# Patient Record
Sex: MALE | Race: WHITE | NOT HISPANIC OR LATINO | Employment: FULL TIME | URBAN - METROPOLITAN AREA
[De-identification: names, ages, dates, MRNs, and addresses within clinical notes are randomized per-mention and may not be internally consistent; named-entity substitution may affect disease eponyms.]

---

## 2023-05-04 ENCOUNTER — TELEPHONE (OUTPATIENT)
Dept: PRIMARY CARE | Facility: CLINIC | Age: 68
End: 2023-05-04
Payer: MEDICARE

## 2023-05-09 PROBLEM — N41.9 PROSTATITIS: Status: ACTIVE | Noted: 2023-05-09

## 2023-05-09 PROBLEM — S90.851A FOREIGN BODY IN FOOT, RIGHT: Status: ACTIVE | Noted: 2023-05-09

## 2023-05-09 PROBLEM — I49.3 PVCS (PREMATURE VENTRICULAR CONTRACTIONS): Status: ACTIVE | Noted: 2023-05-09

## 2023-05-09 PROBLEM — R42 DIZZINESS: Status: ACTIVE | Noted: 2023-05-09

## 2023-05-09 PROBLEM — R00.2 HEART PALPITATIONS: Status: ACTIVE | Noted: 2023-05-09

## 2023-05-09 PROBLEM — I10 BENIGN ESSENTIAL HTN: Status: ACTIVE | Noted: 2023-05-09

## 2023-05-09 PROBLEM — R07.9 CHEST PAIN: Status: ACTIVE | Noted: 2023-05-09

## 2023-05-09 PROBLEM — R11.0 NAUSEA IN ADULT: Status: ACTIVE | Noted: 2023-05-09

## 2023-05-09 PROBLEM — R09.89 CHOKING EPISODE: Status: ACTIVE | Noted: 2023-05-09

## 2023-05-09 PROBLEM — R53.83 FATIGUE: Status: ACTIVE | Noted: 2023-05-09

## 2023-05-09 PROBLEM — R51.9 HEAD ACHE: Status: ACTIVE | Noted: 2023-05-09

## 2023-05-09 PROBLEM — R05.3 CHRONIC COUGH: Status: ACTIVE | Noted: 2023-05-09

## 2023-05-09 PROBLEM — R93.1 ELEVATED CORONARY ARTERY CALCIUM SCORE: Status: ACTIVE | Noted: 2023-05-09

## 2023-05-09 PROBLEM — E78.00 HYPERCHOLESTEREMIA: Status: ACTIVE | Noted: 2023-05-09

## 2023-05-09 PROBLEM — N50.819 TESTICULAR PAIN: Status: ACTIVE | Noted: 2023-05-09

## 2023-05-09 PROBLEM — R61: Status: ACTIVE | Noted: 2023-05-09

## 2023-05-09 PROBLEM — K21.9 LARYNGOPHARYNGEAL REFLUX: Status: ACTIVE | Noted: 2023-05-09

## 2023-05-09 PROBLEM — U07.1 COVID-19 VIRUS INFECTION: Status: ACTIVE | Noted: 2023-05-09

## 2023-05-09 PROBLEM — M79.671 RIGHT FOOT PAIN: Status: ACTIVE | Noted: 2023-05-09

## 2023-05-10 ENCOUNTER — OFFICE VISIT (OUTPATIENT)
Dept: PRIMARY CARE | Facility: CLINIC | Age: 68
End: 2023-05-10
Payer: MEDICARE

## 2023-05-10 VITALS
BODY MASS INDEX: 26.36 KG/M2 | WEIGHT: 178 LBS | HEART RATE: 57 BPM | HEIGHT: 69 IN | TEMPERATURE: 97.9 F | OXYGEN SATURATION: 97 % | DIASTOLIC BLOOD PRESSURE: 79 MMHG | SYSTOLIC BLOOD PRESSURE: 148 MMHG

## 2023-05-10 DIAGNOSIS — L30.9 DERMATITIS: ICD-10-CM

## 2023-05-10 DIAGNOSIS — L60.9 NAIL DISEASE: ICD-10-CM

## 2023-05-10 DIAGNOSIS — Z00.00 ROUTINE GENERAL MEDICAL EXAMINATION AT HEALTH CARE FACILITY: Primary | ICD-10-CM

## 2023-05-10 DIAGNOSIS — Z12.83 SKIN CANCER SCREENING: ICD-10-CM

## 2023-05-10 PROCEDURE — 1159F MED LIST DOCD IN RCRD: CPT | Performed by: INTERNAL MEDICINE

## 2023-05-10 PROCEDURE — 3077F SYST BP >= 140 MM HG: CPT | Performed by: INTERNAL MEDICINE

## 2023-05-10 PROCEDURE — 1170F FXNL STATUS ASSESSED: CPT | Performed by: INTERNAL MEDICINE

## 2023-05-10 PROCEDURE — G0439 PPPS, SUBSEQ VISIT: HCPCS | Performed by: INTERNAL MEDICINE

## 2023-05-10 PROCEDURE — 1036F TOBACCO NON-USER: CPT | Performed by: INTERNAL MEDICINE

## 2023-05-10 PROCEDURE — 3078F DIAST BP <80 MM HG: CPT | Performed by: INTERNAL MEDICINE

## 2023-05-10 RX ORDER — ROSUVASTATIN CALCIUM 40 MG/1
1 TABLET, COATED ORAL DAILY
COMMUNITY
Start: 2021-05-25 | End: 2023-11-13 | Stop reason: SDUPTHER

## 2023-05-10 RX ORDER — TRIAMCINOLONE ACETONIDE 1 MG/G
CREAM TOPICAL 2 TIMES DAILY
Qty: 45 G | Refills: 1 | Status: SHIPPED | OUTPATIENT
Start: 2023-05-10

## 2023-05-10 RX ORDER — METOPROLOL SUCCINATE 25 MG/1
1 TABLET, EXTENDED RELEASE ORAL DAILY
COMMUNITY
Start: 2021-12-16 | End: 2023-11-13 | Stop reason: SDUPTHER

## 2023-05-10 RX ORDER — ASPIRIN 81 MG/1
TABLET ORAL
COMMUNITY
Start: 2021-05-25

## 2023-05-10 RX ORDER — LOSARTAN POTASSIUM 50 MG/1
1 TABLET ORAL DAILY
COMMUNITY
Start: 2021-04-15 | End: 2023-11-13 | Stop reason: SDUPTHER

## 2023-05-10 ASSESSMENT — PATIENT HEALTH QUESTIONNAIRE - PHQ9
SUM OF ALL RESPONSES TO PHQ9 QUESTIONS 1 AND 2: 0
2. FEELING DOWN, DEPRESSED OR HOPELESS: NOT AT ALL
2. FEELING DOWN, DEPRESSED OR HOPELESS: NOT AT ALL
1. LITTLE INTEREST OR PLEASURE IN DOING THINGS: NOT AT ALL

## 2023-05-10 ASSESSMENT — ACTIVITIES OF DAILY LIVING (ADL)
TAKING_MEDICATION: INDEPENDENT
DRESSING: INDEPENDENT
GROCERY_SHOPPING: INDEPENDENT
MANAGING_FINANCES: INDEPENDENT
BATHING: INDEPENDENT
DOING_HOUSEWORK: INDEPENDENT

## 2023-05-10 NOTE — PROGRESS NOTES
"Medicare Wellness Billing Compliance Satisfied    Review all medications by prescribing practitioner or clinical pharmacist (such as prescriptions, OTCs, herbal therapies and supplements) documented in the medical record    Past Medical, Surgical, and Family History reviewed and updated in chart    Tobacco Use Reviewed    Alcohol Use Reviewed    Illicit Drug Use Reviewed    PHQ2/9    Falls in Last Year Reviewed    Home Safety Risk Factors Reviewed    Cognitive Impairment Reviewed    Patient Self Assessment and Health Status    Current Diet Reviewed    Exercise Frequency    ADL - Hearing Impairment    ADL - Bathing    ADL - Dressing    ADL - Walks in Home    IADL - Managing Finances    IADL - Grocery Shopping    IADL - Taking Medications    IADL - Doing Housework      Subjective   Reason for Visit: Douglas Domingo is an 67 y.o. male here for a Medicare Wellness visit.     Past Medical, Surgical, and Family History reviewed and updated in chart.    Reviewed all medications by prescribing practitioner or clinical pharmacist (such as prescriptions, OTCs, herbal therapies and supplements) and documented in the medical record.    HPI  Here for Medicare wellness and a few concerns:  Sharp left sided chest pain, lasting for a few seconds or so. Rarely. Has been worked up fully and ruled out cardiac.  left palm skin red and peeling. left hand nails are becoming rough and ends splitting      Patient Care Team:  Diana Saez MD as PCP - General  Diana Saez MD as PCP - [employer] PCP     Review of Systems  All other ROS negative    Objective   Vitals:  /79   Pulse 57   Temp 36.6 °C (97.9 °F)   Ht 1.753 m (5' 9\")   Wt 80.7 kg (178 lb)   SpO2 97%   BMI 26.29 kg/m²       Physical Exam  Patient appears well, alert and oriented x 3, cooperative. No depression or anxiety.   Vitals are as documented.  Neck is supple and free of adenopathy, or masses. No thyromegaly.   PERRL, extra eye " muscles are intact.   Heart sounds are normal, no murmur, gallops or rubs.   Lungs are clear to auscultation    Abdomen is soft, no tenderness, masses or organomegaly.  Extremities are normal. Peripheral pulses are normal.   Neurologic exam is normal without focal findings.   Skin is normal without suspicious lesions noted except for left hand palm dermatitis present and left hand nails are rough and ends are splitting  No acute joint swelling or pain.    1. Routine general medical examination at health care facility       2. Nail disease     - Referral to Dermatology; Future    3. Dermatitis     - Referral to Dermatology; Future  - triamcinolone (Kenalog) 0.1 % cream; Apply topically 2 times a day.  Dispense: 45 g; Refill: 1    4. Skin cancer screening     - Referral to Dermatology; Future    Follow up 3-6 months

## 2023-11-13 ENCOUNTER — OFFICE VISIT (OUTPATIENT)
Dept: PRIMARY CARE | Facility: CLINIC | Age: 68
End: 2023-11-13
Payer: MEDICARE

## 2023-11-13 ENCOUNTER — APPOINTMENT (OUTPATIENT)
Dept: PRIMARY CARE | Facility: CLINIC | Age: 68
End: 2023-11-13
Payer: MEDICARE

## 2023-11-13 ENCOUNTER — LAB (OUTPATIENT)
Dept: LAB | Facility: LAB | Age: 68
End: 2023-11-13
Payer: MEDICARE

## 2023-11-13 VITALS
DIASTOLIC BLOOD PRESSURE: 72 MMHG | HEIGHT: 69 IN | TEMPERATURE: 97.9 F | OXYGEN SATURATION: 98 % | BODY MASS INDEX: 26.66 KG/M2 | HEART RATE: 61 BPM | WEIGHT: 180 LBS | SYSTOLIC BLOOD PRESSURE: 117 MMHG

## 2023-11-13 DIAGNOSIS — I10 BENIGN ESSENTIAL HTN: ICD-10-CM

## 2023-11-13 DIAGNOSIS — R14.0 ABDOMINAL BLOATING: ICD-10-CM

## 2023-11-13 DIAGNOSIS — R73.03 PRE-DIABETES: ICD-10-CM

## 2023-11-13 DIAGNOSIS — K21.9 GASTROESOPHAGEAL REFLUX DISEASE WITHOUT ESOPHAGITIS: Primary | ICD-10-CM

## 2023-11-13 DIAGNOSIS — Z12.5 SCREENING FOR PROSTATE CANCER: ICD-10-CM

## 2023-11-13 DIAGNOSIS — E78.00 HYPERCHOLESTEREMIA: ICD-10-CM

## 2023-11-13 LAB
ALBUMIN SERPL BCP-MCNC: 4.8 G/DL (ref 3.4–5)
ALP SERPL-CCNC: 44 U/L (ref 33–136)
ALT SERPL W P-5'-P-CCNC: 26 U/L (ref 10–52)
AMYLASE SERPL-CCNC: 88 U/L (ref 29–103)
ANION GAP SERPL CALC-SCNC: 12 MMOL/L (ref 10–20)
APPEARANCE UR: CLEAR
AST SERPL W P-5'-P-CCNC: 20 U/L (ref 9–39)
BILIRUB SERPL-MCNC: 0.8 MG/DL (ref 0–1.2)
BILIRUB UR STRIP.AUTO-MCNC: NEGATIVE MG/DL
BUN SERPL-MCNC: 12 MG/DL (ref 6–23)
CALCIUM SERPL-MCNC: 10.2 MG/DL (ref 8.6–10.6)
CHLORIDE SERPL-SCNC: 103 MMOL/L (ref 98–107)
CHOLEST SERPL-MCNC: 133 MG/DL (ref 0–199)
CHOLESTEROL/HDL RATIO: 2.9
CO2 SERPL-SCNC: 30 MMOL/L (ref 21–32)
COLOR UR: YELLOW
CREAT SERPL-MCNC: 1.03 MG/DL (ref 0.5–1.3)
ERYTHROCYTE [DISTWIDTH] IN BLOOD BY AUTOMATED COUNT: 12.2 % (ref 11.5–14.5)
EST. AVERAGE GLUCOSE BLD GHB EST-MCNC: 117 MG/DL
GFR SERPL CREATININE-BSD FRML MDRD: 79 ML/MIN/1.73M*2
GLUCOSE SERPL-MCNC: 94 MG/DL (ref 74–99)
GLUCOSE UR STRIP.AUTO-MCNC: NEGATIVE MG/DL
HBA1C MFR BLD: 5.7 %
HCT VFR BLD AUTO: 45.5 % (ref 41–52)
HDLC SERPL-MCNC: 45.9 MG/DL
HGB BLD-MCNC: 15.1 G/DL (ref 13.5–17.5)
KETONES UR STRIP.AUTO-MCNC: NEGATIVE MG/DL
LDLC SERPL CALC-MCNC: 62 MG/DL
LEUKOCYTE ESTERASE UR QL STRIP.AUTO: NEGATIVE
LIPASE SERPL-CCNC: 20 U/L (ref 9–82)
MCH RBC QN AUTO: 30.8 PG (ref 26–34)
MCHC RBC AUTO-ENTMCNC: 33.2 G/DL (ref 32–36)
MCV RBC AUTO: 93 FL (ref 80–100)
NITRITE UR QL STRIP.AUTO: NEGATIVE
NON HDL CHOLESTEROL: 87 MG/DL (ref 0–149)
NRBC BLD-RTO: 0 /100 WBCS (ref 0–0)
PH UR STRIP.AUTO: 7 [PH]
PLATELET # BLD AUTO: 259 X10*3/UL (ref 150–450)
POTASSIUM SERPL-SCNC: 4.5 MMOL/L (ref 3.5–5.3)
PROT SERPL-MCNC: 7.7 G/DL (ref 6.4–8.2)
PROT UR STRIP.AUTO-MCNC: NEGATIVE MG/DL
PSA SERPL-MCNC: 1.91 NG/ML
RBC # BLD AUTO: 4.9 X10*6/UL (ref 4.5–5.9)
RBC # UR STRIP.AUTO: NEGATIVE /UL
SODIUM SERPL-SCNC: 140 MMOL/L (ref 136–145)
SP GR UR STRIP.AUTO: 1.01
TRIGL SERPL-MCNC: 128 MG/DL (ref 0–149)
UROBILINOGEN UR STRIP.AUTO-MCNC: <2 MG/DL
VLDL: 26 MG/DL (ref 0–40)
WBC # BLD AUTO: 6.8 X10*3/UL (ref 4.4–11.3)

## 2023-11-13 PROCEDURE — 1036F TOBACCO NON-USER: CPT | Performed by: INTERNAL MEDICINE

## 2023-11-13 PROCEDURE — 3078F DIAST BP <80 MM HG: CPT | Performed by: INTERNAL MEDICINE

## 2023-11-13 PROCEDURE — 1159F MED LIST DOCD IN RCRD: CPT | Performed by: INTERNAL MEDICINE

## 2023-11-13 PROCEDURE — 83036 HEMOGLOBIN GLYCOSYLATED A1C: CPT

## 2023-11-13 PROCEDURE — 80061 LIPID PANEL: CPT

## 2023-11-13 PROCEDURE — 82150 ASSAY OF AMYLASE: CPT

## 2023-11-13 PROCEDURE — 83690 ASSAY OF LIPASE: CPT

## 2023-11-13 PROCEDURE — G0103 PSA SCREENING: HCPCS

## 2023-11-13 PROCEDURE — 1126F AMNT PAIN NOTED NONE PRSNT: CPT | Performed by: INTERNAL MEDICINE

## 2023-11-13 PROCEDURE — 99214 OFFICE O/P EST MOD 30 MIN: CPT | Performed by: INTERNAL MEDICINE

## 2023-11-13 PROCEDURE — 3074F SYST BP LT 130 MM HG: CPT | Performed by: INTERNAL MEDICINE

## 2023-11-13 PROCEDURE — 85027 COMPLETE CBC AUTOMATED: CPT

## 2023-11-13 PROCEDURE — 36415 COLL VENOUS BLD VENIPUNCTURE: CPT

## 2023-11-13 PROCEDURE — 80053 COMPREHEN METABOLIC PANEL: CPT

## 2023-11-13 PROCEDURE — 81003 URINALYSIS AUTO W/O SCOPE: CPT

## 2023-11-13 RX ORDER — LOSARTAN POTASSIUM 50 MG/1
50 TABLET ORAL DAILY
Qty: 90 TABLET | Refills: 1 | Status: SHIPPED | OUTPATIENT
Start: 2023-11-13 | End: 2024-05-15

## 2023-11-13 RX ORDER — METOPROLOL SUCCINATE 25 MG/1
25 TABLET, EXTENDED RELEASE ORAL DAILY
Qty: 90 TABLET | Refills: 1 | Status: SHIPPED | OUTPATIENT
Start: 2023-11-13 | End: 2024-11-12

## 2023-11-13 RX ORDER — ROSUVASTATIN CALCIUM 40 MG/1
40 TABLET, COATED ORAL DAILY
Qty: 90 TABLET | Refills: 1 | Status: SHIPPED | OUTPATIENT
Start: 2023-11-13 | End: 2024-11-12

## 2023-11-13 NOTE — PROGRESS NOTES
"PCP: Diana Saez MD   I have reviewed existing histories, notes, past medical history, surgical history, social history, family history, med list, allergy list, and immunization, and updated.    HPI:   Routine Follow up, med refills. Also Complains of  GERD symptoms for one year, and mid upper central Abdominal pain and bloating for 3-4 months. No other symptoms such as black stool, lack of appetite, diarrhea, constipation, and no symptoms of UTI.    Feeling well  Still working, 40 hours per week.  Pt had shingrix in December and early this year at [facility]      Lab Results   Component Value Date    WBC 6.2 09/02/2022    HGB 14.1 09/02/2022    HCT 41.8 09/02/2022     09/02/2022    CHOL 103 09/02/2022    TRIG 64 09/02/2022    HDL 40.2 09/02/2022    ALT 21 09/02/2022    AST 18 09/02/2022     09/02/2022    K 4.6 09/02/2022     09/02/2022    CREATININE 1.03 09/02/2022    BUN 21 09/02/2022    CO2 29 09/02/2022    TSH 1.54 04/08/2021    PSA 1.02 04/08/2021     Physical exam:  /72   Pulse 61   Temp 36.6 °C (97.9 °F)   Ht 1.753 m (5' 9\")   Wt 81.6 kg (180 lb)   SpO2 98%   BMI 26.58 kg/m²    Neck exam showed no mass, lymphadenopathy, or thyroid enlargement, and no carotid bruit.  Heart exam showed normal heart sounds, no murmur, clicks, gallops or rubs. Regular rate and rhythm. Chest is clear; no wheezes or rales.  The abdomen is soft without tenderness, guarding, mass, rebound or organomegaly. Bowel sounds are normal. No CVA tenderness or inguinal adenopathy noted.  No leg edema.      Assessments/orders:   1. Gastroesophageal reflux disease without esophagitis        2. Benign essential HTN  losartan (Cozaar) 50 mg tablet, metoprolol succinate XL (Toprol-XL) 25 mg 24 hr tablet, Comprehensive Metabolic Panel      3. Hypercholesteremia  rosuvastatin (Crestor) 40 mg tablet, Lipid Panel      4. Abdominal bloating  CBC, Comprehensive Metabolic Panel, Amylase, Lipase, US abdomen, Urinalysis with " Reflex Microscopic      5. Pre-diabetes  Hemoglobin A1C, Comprehensive Metabolic Panel      6. Screening for prostate cancer  Prostate Specific Antigen, Screen        If labs, ultrasound okay, may need EGD. He will let me know if symptoms persist

## 2024-05-13 ENCOUNTER — OFFICE VISIT (OUTPATIENT)
Dept: PRIMARY CARE | Facility: CLINIC | Age: 69
End: 2024-05-13
Payer: MEDICARE

## 2024-05-13 ENCOUNTER — APPOINTMENT (OUTPATIENT)
Dept: PRIMARY CARE | Facility: CLINIC | Age: 69
End: 2024-05-13
Payer: MEDICARE

## 2024-05-13 VITALS
BODY MASS INDEX: 26.07 KG/M2 | OXYGEN SATURATION: 98 % | DIASTOLIC BLOOD PRESSURE: 80 MMHG | SYSTOLIC BLOOD PRESSURE: 132 MMHG | TEMPERATURE: 97.7 F | HEIGHT: 69 IN | WEIGHT: 176 LBS | HEART RATE: 55 BPM

## 2024-05-13 DIAGNOSIS — Z00.00 PHYSICAL EXAM: ICD-10-CM

## 2024-05-13 DIAGNOSIS — Z00.00 MEDICARE ANNUAL WELLNESS VISIT, SUBSEQUENT: Primary | ICD-10-CM

## 2024-05-13 DIAGNOSIS — R73.03 PRE-DIABETES: ICD-10-CM

## 2024-05-13 DIAGNOSIS — M25.521 RIGHT ELBOW PAIN: ICD-10-CM

## 2024-05-13 DIAGNOSIS — R41.3 MEMORY DEFICIT: ICD-10-CM

## 2024-05-13 DIAGNOSIS — I10 BENIGN ESSENTIAL HTN: ICD-10-CM

## 2024-05-13 DIAGNOSIS — H53.8 BLURRED VISION: ICD-10-CM

## 2024-05-13 PROCEDURE — G0439 PPPS, SUBSEQ VISIT: HCPCS | Performed by: INTERNAL MEDICINE

## 2024-05-13 PROCEDURE — 99397 PER PM REEVAL EST PAT 65+ YR: CPT | Performed by: INTERNAL MEDICINE

## 2024-05-13 PROCEDURE — 1123F ACP DISCUSS/DSCN MKR DOCD: CPT | Performed by: INTERNAL MEDICINE

## 2024-05-13 PROCEDURE — 99214 OFFICE O/P EST MOD 30 MIN: CPT | Performed by: INTERNAL MEDICINE

## 2024-05-13 PROCEDURE — 1036F TOBACCO NON-USER: CPT | Performed by: INTERNAL MEDICINE

## 2024-05-13 PROCEDURE — 3079F DIAST BP 80-89 MM HG: CPT | Performed by: INTERNAL MEDICINE

## 2024-05-13 PROCEDURE — 1158F ADVNC CARE PLAN TLK DOCD: CPT | Performed by: INTERNAL MEDICINE

## 2024-05-13 PROCEDURE — 1159F MED LIST DOCD IN RCRD: CPT | Performed by: INTERNAL MEDICINE

## 2024-05-13 PROCEDURE — 3075F SYST BP GE 130 - 139MM HG: CPT | Performed by: INTERNAL MEDICINE

## 2024-05-13 PROCEDURE — 1170F FXNL STATUS ASSESSED: CPT | Performed by: INTERNAL MEDICINE

## 2024-05-13 ASSESSMENT — ACTIVITIES OF DAILY LIVING (ADL)
BATHING: INDEPENDENT
GROCERY_SHOPPING: INDEPENDENT
TAKING_MEDICATION: INDEPENDENT
MANAGING_FINANCES: INDEPENDENT
DOING_HOUSEWORK: INDEPENDENT
DRESSING: INDEPENDENT

## 2024-05-13 ASSESSMENT — PATIENT HEALTH QUESTIONNAIRE - PHQ9
2. FEELING DOWN, DEPRESSED OR HOPELESS: NOT AT ALL
SUM OF ALL RESPONSES TO PHQ9 QUESTIONS 1 AND 2: 0
1. LITTLE INTEREST OR PLEASURE IN DOING THINGS: NOT AT ALL

## 2024-05-13 NOTE — PROGRESS NOTES
"SUBJECTIVE:   Douglas Domingo is a 68 y.o. male presenting for his annual physical/wellness.  PCP: Diana Saez MD  Medicare wellness, physical, and a few concerns.  right elbow pain for a few months. 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 He thinks likely over use from that. Denied hx of injury.  It is ache all the time.     Memory is bad, both short term and long term per pt. Mostly difficult to learn new info, and short term memory. First noted one year ago.  No falling or head trauma. He misplaces things, denied confusion, getting loss with driving. No headache.    Sees optometrist at [facility]. Noted need to get new eye glass rx, and was told having \"spider webs\" in his eyes.    He does not smoke  Drinks 1-2 beer per day.  Not vegetarian.      Colon screening:    Prostate screening:   Lab Results   Component Value Date    PSA 1.02 04/08/2021     Vaccines:  Up to date   Diet:   healthy.  Exercises:   regularly.  Lab Results   Component Value Date    HGBA1C 5.7 (H) 11/13/2023     Lab Results   Component Value Date    CREATININE 1.03 11/13/2023     Lab Results   Component Value Date    CHOL 133 11/13/2023    CHOL 103 09/02/2022    CHOL 201 (H) 04/08/2021     Lab Results   Component Value Date    HDL 45.9 11/13/2023    HDL 40.2 09/02/2022    HDL 43.4 04/08/2021     Lab Results   Component Value Date    LDLCALC 62 11/13/2023     Lab Results   Component Value Date    TRIG 128 11/13/2023    TRIG 64 09/02/2022    TRIG 138 04/08/2021     No components found for: \"CHOLHDL\"    ROS:  otherwise Feeling well. No dyspnea or chest pain on exertion. No abdominal pain, change in bowel habits, black or bloody stools. No urinary tract or prostatic symptoms. No neurological complaints.       OBJECTIVE:   The patient appears well, alert, oriented x 3, in no distress.   /80   Pulse 55   Temp 36.5 °C (97.7 °F)   Ht 1.746 m (5' 8.75\")   Wt 79.8 kg (176 lb)   SpO2 98%   BMI 26.18 " kg/m²   ENT normal.  Neck supple. No adenopathy or thyromegaly. SHERLY. Lungs are clear, good air entry, no wheezes, rhonchi or rales. S1 and S2 normal, no murmurs, regular rate and rhythm. Abdomen is soft without tenderness, guarding, mass or organomegaly. Extremities show no edema, normal peripheral pulses. Neurological is normal without focal findings.    Mini mental status:  Miscalculated serial 7 subtraction (2/5)  Had difficulty drawing clock hands to show correct time.  otherwise was alright.    1. Medicare annual wellness visit, subsequent        2. Physical exam        3. Pre-diabetes  Hemoglobin A1C      4. Right elbow pain  XR elbow left 1-2 views, Referral to Orthopaedic Surgery      5. Benign essential HTN        6. Memory deficit  Vitamin B12, TSH with reflex to Free T4 if abnormal, Syphilis Screen with Reflex, CBC and Auto Differential, CT head wo IV contrast      7. Blurred vision  Referral to Ophthalmology        Follow up 6 months. Call if symptoms are any worse.   Refer for right elbow pain, xray's.   Refer for Blurry vision.  Get labs and ct head for memory deficit

## 2024-05-15 DIAGNOSIS — I10 BENIGN ESSENTIAL HTN: ICD-10-CM

## 2024-05-15 RX ORDER — LOSARTAN POTASSIUM 50 MG/1
50 TABLET ORAL DAILY
Qty: 90 TABLET | Refills: 3 | Status: SHIPPED | OUTPATIENT
Start: 2024-05-15

## 2024-07-10 DIAGNOSIS — E78.00 HYPERCHOLESTEREMIA: ICD-10-CM

## 2024-07-10 RX ORDER — ROSUVASTATIN CALCIUM 40 MG/1
40 TABLET, COATED ORAL DAILY
Qty: 90 TABLET | Refills: 3 | Status: SHIPPED | OUTPATIENT
Start: 2024-07-10 | End: 2025-07-10

## 2024-09-09 DIAGNOSIS — I10 BENIGN ESSENTIAL HTN: ICD-10-CM

## 2024-09-09 RX ORDER — METOPROLOL SUCCINATE 25 MG/1
25 TABLET, EXTENDED RELEASE ORAL DAILY
Qty: 90 TABLET | Refills: 1 | Status: SHIPPED | OUTPATIENT
Start: 2024-09-09 | End: 2025-09-09

## 2024-09-24 ENCOUNTER — APPOINTMENT (OUTPATIENT)
Dept: OPHTHALMOLOGY | Facility: CLINIC | Age: 69
End: 2024-09-24
Payer: MEDICARE

## 2024-09-24 DIAGNOSIS — H52.203 HYPEROPIA OF BOTH EYES WITH ASTIGMATISM AND PRESBYOPIA: ICD-10-CM

## 2024-09-24 DIAGNOSIS — H52.03 HYPEROPIA OF BOTH EYES WITH ASTIGMATISM AND PRESBYOPIA: ICD-10-CM

## 2024-09-24 DIAGNOSIS — H52.4 HYPEROPIA OF BOTH EYES WITH ASTIGMATISM AND PRESBYOPIA: ICD-10-CM

## 2024-09-24 DIAGNOSIS — H53.8 BLURRED VISION: ICD-10-CM

## 2024-09-24 DIAGNOSIS — H18.533: Primary | ICD-10-CM

## 2024-09-24 PROCEDURE — 99203 OFFICE O/P NEW LOW 30 MIN: CPT | Performed by: STUDENT IN AN ORGANIZED HEALTH CARE EDUCATION/TRAINING PROGRAM

## 2024-09-24 ASSESSMENT — CONF VISUAL FIELD
OD_SUPERIOR_TEMPORAL_RESTRICTION: 0
OS_INFERIOR_NASAL_RESTRICTION: 0
OS_NORMAL: 1
OS_SUPERIOR_NASAL_RESTRICTION: 0
OD_INFERIOR_TEMPORAL_RESTRICTION: 0
OS_INFERIOR_TEMPORAL_RESTRICTION: 0
OS_SUPERIOR_TEMPORAL_RESTRICTION: 0
OD_SUPERIOR_NASAL_RESTRICTION: 0
OD_INFERIOR_NASAL_RESTRICTION: 0
METHOD: COUNTING FINGERS
OD_NORMAL: 1

## 2024-09-24 ASSESSMENT — ENCOUNTER SYMPTOMS
PSYCHIATRIC NEGATIVE: 0
MUSCULOSKELETAL NEGATIVE: 0
HEMATOLOGIC/LYMPHATIC NEGATIVE: 0
CONSTITUTIONAL NEGATIVE: 0
EYES NEGATIVE: 1
GASTROINTESTINAL NEGATIVE: 0
CARDIOVASCULAR NEGATIVE: 0
NEUROLOGICAL NEGATIVE: 0
ENDOCRINE NEGATIVE: 0
ALLERGIC/IMMUNOLOGIC NEGATIVE: 0
RESPIRATORY NEGATIVE: 0

## 2024-09-24 ASSESSMENT — VISUAL ACUITY
CORRECTION_TYPE: GLASSES
OD_CC: 20/30
OS_CC: 20/20
OD_CC: 20/20
OS_CC: 20/20
OD_PH_CC: 20/25
METHOD: SNELLEN - LINEAR

## 2024-09-24 ASSESSMENT — REFRACTION_WEARINGRX
OD_AXIS: 159
SPECS_TYPE: PAL
OS_CYLINDER: -0.25
OS_ADD: +2.50
OS_SPHERE: +1.25
OD_SPHERE: +1.25
OS_AXIS: 034
OD_CYLINDER: -0.75
OD_ADD: +2.50

## 2024-09-24 ASSESSMENT — SLIT LAMP EXAM - LIDS
COMMENTS: NORMAL
COMMENTS: NORMAL

## 2024-09-24 ASSESSMENT — TONOMETRY
OD_IOP_MMHG: 12
IOP_METHOD: GOLDMANN APPLANATION
OS_IOP_MMHG: 15

## 2024-09-24 NOTE — PROGRESS NOTES
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 Warm compresses were also recommended. If vision or symptoms were to worsen consider putting on Restasis, Doxy, and FML for short term use. Could also consider amniotic membrane/Cam 360.     RTC in 6 months for F/U or sooner if having a recurrent corneal erosion/ vision changes/ pain.

## 2024-11-13 ENCOUNTER — LAB (OUTPATIENT)
Dept: LAB | Facility: LAB | Age: 69
End: 2024-11-13
Payer: MEDICARE

## 2024-11-13 DIAGNOSIS — R41.3 MEMORY DEFICIT: ICD-10-CM

## 2024-11-13 DIAGNOSIS — R73.03 PRE-DIABETES: ICD-10-CM

## 2024-11-13 LAB
BASOPHILS # BLD AUTO: 0.04 X10*3/UL (ref 0–0.1)
BASOPHILS NFR BLD AUTO: 0.6 %
EOSINOPHIL # BLD AUTO: 0.29 X10*3/UL (ref 0–0.7)
EOSINOPHIL NFR BLD AUTO: 4.2 %
ERYTHROCYTE [DISTWIDTH] IN BLOOD BY AUTOMATED COUNT: 12.4 % (ref 11.5–14.5)
EST. AVERAGE GLUCOSE BLD GHB EST-MCNC: 123 MG/DL
HBA1C MFR BLD: 5.9 %
HCT VFR BLD AUTO: 43.1 % (ref 41–52)
HGB BLD-MCNC: 14.6 G/DL (ref 13.5–17.5)
IMM GRANULOCYTES # BLD AUTO: 0.07 X10*3/UL (ref 0–0.7)
IMM GRANULOCYTES NFR BLD AUTO: 1 % (ref 0–0.9)
LYMPHOCYTES # BLD AUTO: 1.79 X10*3/UL (ref 1.2–4.8)
LYMPHOCYTES NFR BLD AUTO: 26 %
MCH RBC QN AUTO: 30.6 PG (ref 26–34)
MCHC RBC AUTO-ENTMCNC: 33.9 G/DL (ref 32–36)
MCV RBC AUTO: 90 FL (ref 80–100)
MONOCYTES # BLD AUTO: 0.76 X10*3/UL (ref 0.1–1)
MONOCYTES NFR BLD AUTO: 11 %
NEUTROPHILS # BLD AUTO: 3.94 X10*3/UL (ref 1.2–7.7)
NEUTROPHILS NFR BLD AUTO: 57.2 %
NRBC BLD-RTO: 0 /100 WBCS (ref 0–0)
PLATELET # BLD AUTO: 264 X10*3/UL (ref 150–450)
RBC # BLD AUTO: 4.77 X10*6/UL (ref 4.5–5.9)
TREPONEMA PALLIDUM IGG+IGM AB [PRESENCE] IN SERUM OR PLASMA BY IMMUNOASSAY: NONREACTIVE
TSH SERPL-ACNC: 2.29 MIU/L (ref 0.44–3.98)
VIT B12 SERPL-MCNC: 402 PG/ML (ref 211–911)
WBC # BLD AUTO: 6.9 X10*3/UL (ref 4.4–11.3)

## 2024-11-13 PROCEDURE — 86780 TREPONEMA PALLIDUM: CPT

## 2024-11-13 PROCEDURE — 36415 COLL VENOUS BLD VENIPUNCTURE: CPT

## 2024-11-13 PROCEDURE — 84443 ASSAY THYROID STIM HORMONE: CPT

## 2024-11-13 PROCEDURE — 82607 VITAMIN B-12: CPT

## 2024-11-13 PROCEDURE — 83036 HEMOGLOBIN GLYCOSYLATED A1C: CPT

## 2024-11-13 PROCEDURE — 85025 COMPLETE CBC W/AUTO DIFF WBC: CPT

## 2024-11-19 ENCOUNTER — APPOINTMENT (OUTPATIENT)
Dept: PRIMARY CARE | Facility: CLINIC | Age: 69
End: 2024-11-19
Payer: MEDICARE

## 2024-11-19 ENCOUNTER — LAB (OUTPATIENT)
Dept: LAB | Facility: LAB | Age: 69
End: 2024-11-19
Payer: MEDICARE

## 2024-11-19 VITALS
BODY MASS INDEX: 26.07 KG/M2 | SYSTOLIC BLOOD PRESSURE: 120 MMHG | DIASTOLIC BLOOD PRESSURE: 75 MMHG | OXYGEN SATURATION: 94 % | TEMPERATURE: 97.6 F | HEART RATE: 60 BPM | WEIGHT: 176 LBS | HEIGHT: 69 IN

## 2024-11-19 DIAGNOSIS — R10.13 EPIGASTRIC PAIN: ICD-10-CM

## 2024-11-19 DIAGNOSIS — R42 DIZZINESS: ICD-10-CM

## 2024-11-19 DIAGNOSIS — Z12.5 SCREENING FOR PROSTATE CANCER: ICD-10-CM

## 2024-11-19 DIAGNOSIS — I10 BENIGN ESSENTIAL HTN: ICD-10-CM

## 2024-11-19 DIAGNOSIS — G47.00 INSOMNIA, UNSPECIFIED TYPE: ICD-10-CM

## 2024-11-19 DIAGNOSIS — K21.9 GASTROESOPHAGEAL REFLUX DISEASE WITHOUT ESOPHAGITIS: ICD-10-CM

## 2024-11-19 DIAGNOSIS — R53.83 OTHER FATIGUE: Primary | ICD-10-CM

## 2024-11-19 DIAGNOSIS — R41.3 MEMORY DEFICIT: ICD-10-CM

## 2024-11-19 PROBLEM — Z86.79 HISTORY OF HYPERTENSION: Status: ACTIVE | Noted: 2024-11-19

## 2024-11-19 LAB
ALBUMIN SERPL BCP-MCNC: 4.5 G/DL (ref 3.4–5)
ALP SERPL-CCNC: 53 U/L (ref 33–136)
ALT SERPL W P-5'-P-CCNC: 30 U/L (ref 10–52)
AMYLASE SERPL-CCNC: 92 U/L (ref 29–103)
ANION GAP SERPL CALC-SCNC: 13 MMOL/L (ref 10–20)
AST SERPL W P-5'-P-CCNC: 22 U/L (ref 9–39)
BILIRUB SERPL-MCNC: 0.5 MG/DL (ref 0–1.2)
BUN SERPL-MCNC: 11 MG/DL (ref 6–23)
CALCIUM SERPL-MCNC: 9.7 MG/DL (ref 8.6–10.6)
CHLORIDE SERPL-SCNC: 103 MMOL/L (ref 98–107)
CO2 SERPL-SCNC: 29 MMOL/L (ref 21–32)
CREAT SERPL-MCNC: 1 MG/DL (ref 0.5–1.3)
EGFRCR SERPLBLD CKD-EPI 2021: 81 ML/MIN/1.73M*2
GLUCOSE SERPL-MCNC: 111 MG/DL (ref 74–99)
LIPASE SERPL-CCNC: 44 U/L (ref 9–82)
POTASSIUM SERPL-SCNC: 4.4 MMOL/L (ref 3.5–5.3)
PROT SERPL-MCNC: 7.5 G/DL (ref 6.4–8.2)
PSA SERPL-MCNC: 1.16 NG/ML
SODIUM SERPL-SCNC: 141 MMOL/L (ref 136–145)

## 2024-11-19 PROCEDURE — 85652 RBC SED RATE AUTOMATED: CPT

## 2024-11-19 PROCEDURE — 3008F BODY MASS INDEX DOCD: CPT | Performed by: INTERNAL MEDICINE

## 2024-11-19 PROCEDURE — 1158F ADVNC CARE PLAN TLK DOCD: CPT | Performed by: INTERNAL MEDICINE

## 2024-11-19 PROCEDURE — 1159F MED LIST DOCD IN RCRD: CPT | Performed by: INTERNAL MEDICINE

## 2024-11-19 PROCEDURE — G0103 PSA SCREENING: HCPCS

## 2024-11-19 PROCEDURE — 83690 ASSAY OF LIPASE: CPT

## 2024-11-19 PROCEDURE — 99214 OFFICE O/P EST MOD 30 MIN: CPT | Performed by: INTERNAL MEDICINE

## 2024-11-19 PROCEDURE — 1036F TOBACCO NON-USER: CPT | Performed by: INTERNAL MEDICINE

## 2024-11-19 PROCEDURE — 80053 COMPREHEN METABOLIC PANEL: CPT

## 2024-11-19 PROCEDURE — 3078F DIAST BP <80 MM HG: CPT | Performed by: INTERNAL MEDICINE

## 2024-11-19 PROCEDURE — 3074F SYST BP LT 130 MM HG: CPT | Performed by: INTERNAL MEDICINE

## 2024-11-19 PROCEDURE — G2211 COMPLEX E/M VISIT ADD ON: HCPCS | Performed by: INTERNAL MEDICINE

## 2024-11-19 PROCEDURE — 1123F ACP DISCUSS/DSCN MKR DOCD: CPT | Performed by: INTERNAL MEDICINE

## 2024-11-19 PROCEDURE — 82150 ASSAY OF AMYLASE: CPT

## 2024-11-19 PROCEDURE — 36415 COLL VENOUS BLD VENIPUNCTURE: CPT

## 2024-11-19 RX ORDER — HYDROCODONE BITARTRATE AND ACETAMINOPHEN 5; 325 MG/1; MG/1
TABLET ORAL
COMMUNITY
Start: 2024-09-23

## 2024-11-19 NOTE — ASSESSMENT & PLAN NOTE
Reminded him to drink more fluid if he sits at beach for 4-5 hours at a time only drinking beer  Orders:    Comprehensive Metabolic Panel; Future

## 2024-11-19 NOTE — PROGRESS NOTES
"PCP: Diana Saez MD   I have reviewed existing histories, notes, past medical history, surgical history, social history, family history, med list, allergy list, and immunization, and updated.    HPI:    Pt is here with his sister ((hernandez's mom).  Concerns over bp, GERD, epigastric pain, chronic fatigue, dizziness, not sleeping well.  Bp at home are not steady, any where from 130/69 to 163/109 (this high is only occasional)    Wakes up at 2-3 am, difficult to fall back to sleep, has been for one year.  occasionally feels mild dizziness, which does not last long, has had for a month or so.    Chronic fatigue ever since he had covid infection, was getting better, than got worse after covid vaccines. This started 3 years ago  He is able to do power walking, 3-4 miles, no sob or cp or leg edema.    New onset of GERD, and epigastric pain, on and off for past year. No black stool, Weight loss. When asked about appetite, he says he only has appetite for cakes.    Gets headache occasionally  No change in eyesight    No Numbness, tingling or weakness face, legs or arms      Lab Results   Component Value Date    WBC 6.9 11/13/2024    HGB 14.6 11/13/2024    HCT 43.1 11/13/2024     11/13/2024    CHOL 133 11/13/2023    TRIG 128 11/13/2023    HDL 45.9 11/13/2023    ALT 26 11/13/2023    AST 20 11/13/2023     11/13/2023    K 4.5 11/13/2023     11/13/2023    CREATININE 1.03 11/13/2023    BUN 12 11/13/2023    CO2 30 11/13/2023    TSH 2.29 11/13/2024    PSA 1.02 04/08/2021    HGBA1C 5.9 (H) 11/13/2024     Physical exam:  /75   Pulse 60   Temp 36.4 °C (97.6 °F)   Ht 1.74 m (5' 8.5\")   Wt 79.8 kg (176 lb)   SpO2 94%   BMI 26.37 kg/m²    Patient appears well, alert and oriented x 3, cooperative. No depression or anxiety.   Vitals are as documented.  Neck is supple and free of adenopathy, or masses. No thyromegaly.   PERRL, extra eye muscles are intact.  Ears, throat are normal.  Heart sounds are normal, no " What Type Of Note Output Would You Prefer (Optional)?: Standard Output murmur, gallops or rubs.   Lungs are clear to auscultation    Abdomen is soft, no tenderness, masses or organomegaly.  Extremities are normal. Peripheral pulses are normal.   Neurologic exam is normal without focal findings.   Skin is normal without suspicious lesions noted.   No acute joint swelling or pain.      Assessments/orders:   Assessment & Plan  Other fatigue         Epigastric pain  Okay to take Prilosec OTC 20mg daily  Orders:    Amylase; Future    Lipase; Future    Sedimentation Rate; Future    Comprehensive Metabolic Panel; Future    Esophagogastroduodenoscopy (EGD); Future    CT abdomen pelvis w IV contrast; Future    Gastroesophageal reflux disease without esophagitis    Orders:    Esophagogastroduodenoscopy (EGD); Future    Memory deficit  I reminded him to get the ct scan head that I ordered in May       Dizziness  Reminded him to drink more fluid if he sits at beach for 4-5 hours at a time only drinking beer  Orders:    Comprehensive Metabolic Panel; Future    Benign essential HTN  Continue losartan 50mg per day for now.    Orders:    Comprehensive Metabolic Panel; Future    Insomnia, unspecified type  Okay to take melatonin       Screening for prostate cancer    Orders:    Prostate Specific Antigen, Screen; Future         Follow up 2 months

## 2024-11-20 LAB — ERYTHROCYTE [SEDIMENTATION RATE] IN BLOOD BY WESTERGREN METHOD: 4 MM/H (ref 0–20)

## 2024-11-26 ENCOUNTER — HOSPITAL ENCOUNTER (OUTPATIENT)
Dept: GASTROENTEROLOGY | Facility: HOSPITAL | Age: 69
Discharge: HOME | End: 2024-11-26
Payer: MEDICARE

## 2024-11-26 VITALS
RESPIRATION RATE: 16 BRPM | BODY MASS INDEX: 24.62 KG/M2 | HEART RATE: 60 BPM | SYSTOLIC BLOOD PRESSURE: 102 MMHG | HEIGHT: 70 IN | TEMPERATURE: 97.7 F | WEIGHT: 171.96 LBS | DIASTOLIC BLOOD PRESSURE: 58 MMHG | OXYGEN SATURATION: 97 %

## 2024-11-26 DIAGNOSIS — R10.13 EPIGASTRIC PAIN: ICD-10-CM

## 2024-11-26 DIAGNOSIS — K21.9 GASTROESOPHAGEAL REFLUX DISEASE WITHOUT ESOPHAGITIS: ICD-10-CM

## 2024-11-26 PROCEDURE — 43239 EGD BIOPSY SINGLE/MULTIPLE: CPT | Performed by: STUDENT IN AN ORGANIZED HEALTH CARE EDUCATION/TRAINING PROGRAM

## 2024-11-26 PROCEDURE — 7100000009 HC PHASE TWO TIME - INITIAL BASE CHARGE

## 2024-11-26 PROCEDURE — 2500000004 HC RX 250 GENERAL PHARMACY W/ HCPCS (ALT 636 FOR OP/ED): Performed by: STUDENT IN AN ORGANIZED HEALTH CARE EDUCATION/TRAINING PROGRAM

## 2024-11-26 PROCEDURE — 3700000012 HC SEDATION LEVEL 5+ TIME - INITIAL 15 MINUTES 5/> YEARS

## 2024-11-26 PROCEDURE — 7100000010 HC PHASE TWO TIME - EACH INCREMENTAL 1 MINUTE

## 2024-11-26 RX ORDER — MIDAZOLAM HYDROCHLORIDE 1 MG/ML
INJECTION INTRAMUSCULAR; INTRAVENOUS AS NEEDED
Status: COMPLETED | OUTPATIENT
Start: 2024-11-26 | End: 2024-11-26

## 2024-11-26 RX ORDER — FENTANYL CITRATE 50 UG/ML
INJECTION, SOLUTION INTRAMUSCULAR; INTRAVENOUS AS NEEDED
Status: COMPLETED | OUTPATIENT
Start: 2024-11-26 | End: 2024-11-26

## 2024-11-26 ASSESSMENT — PAIN SCALES - GENERAL
PAINLEVEL_OUTOF10: 0 - NO PAIN

## 2024-11-26 ASSESSMENT — COLUMBIA-SUICIDE SEVERITY RATING SCALE - C-SSRS
6. HAVE YOU EVER DONE ANYTHING, STARTED TO DO ANYTHING, OR PREPARED TO DO ANYTHING TO END YOUR LIFE?: NO
1. IN THE PAST MONTH, HAVE YOU WISHED YOU WERE DEAD OR WISHED YOU COULD GO TO SLEEP AND NOT WAKE UP?: NO
2. HAVE YOU ACTUALLY HAD ANY THOUGHTS OF KILLING YOURSELF?: NO

## 2024-11-26 ASSESSMENT — PAIN - FUNCTIONAL ASSESSMENT
PAIN_FUNCTIONAL_ASSESSMENT: 0-10

## 2024-11-26 NOTE — H&P
"History Of Present Illness  Douglas Domingo is a 69 y.o. male presenting with acid reflux, abdominal pain, no ibuprofen use. No dysphagia.     Past Medical History  Past Medical History:   Diagnosis Date    COVID-19 03/30/2021    COVID-19 virus infection    Essential (primary) hypertension 08/30/2022    Benign essential HTN    Personal history of other diseases of the circulatory system     History of hypertension    Personal history of other specified conditions 12/16/2021    History of palpitations    Personal history of other specified conditions     History of chest pain    Pure hypercholesterolemia, unspecified 08/30/2022    Hypercholesteremia     Surgical History  Past Surgical History:   Procedure Laterality Date    MR HEAD ANGIO WO IV CONTRAST  1/29/2012    MR HEAD ANGIO WO IV CONTRAST [facility]    OTHER SURGICAL HISTORY  03/30/2021    Knee arthroscopy     Social History  He reports that he has never smoked. He has never used smokeless tobacco. He reports current alcohol use of about 10.0 standard drinks of alcohol per week. He reports that he does not use drugs.    Family History  No family history on file.     Allergies  No Known Allergies  Review of Systems  Pre-sedation Evaluation:  ASA Classification - ASA 2 - Patient with mild systemic disease with no functional limitations  Mallampati Score - II (hard and soft palate, upper portion of tonsils and uvula visible)    Physical Exam  Healthy adult in no acute distress, non toxic appearing  EOMI, no jaundice  Heart is RRR, no murmurs  Lungs are CTAB  Abdomen is soft and nttp  No LE edema  AOx3    Last Recorded Vitals  Blood pressure 138/85, pulse 59, temperature 36 °C (96.8 °F), temperature source Temporal, resp. rate 16, height 1.778 m (5' 10\"), weight 78 kg (171 lb 15.3 oz), SpO2 98%.     Assessment/Plan   Proceed with EGD     PTA/Current Medications:  (Not in a hospital admission)    Current Outpatient Medications   Medication Sig Dispense " Refill    aspirin 81 mg EC tablet Take by mouth once daily.      cholecalciferol, vitamin D3, (VITAMIN D3 ORAL) Take by mouth.      HYDROcodone-acetaminophen (Norco) 5-325 mg tablet       losartan (Cozaar) 50 mg tablet TAKE ONE TABLET BY MOUTH EVERY DAY 90 tablet 3    metoprolol succinate XL (Toprol-XL) 25 mg 24 hr tablet Take 1 tablet (25 mg) by mouth once daily. 90 tablet 1    rosuvastatin (Crestor) 40 mg tablet Take 1 tablet (40 mg) by mouth once daily. 90 tablet 3    triamcinolone (Kenalog) 0.1 % cream Apply topically 2 times a day. 45 g 1     No current facility-administered medications for this encounter.     Sebastien Walker MD

## 2024-11-26 NOTE — NURSING NOTE
1058- PHASE II - Patient to Endoscopy bay at this time in stable condition. Beside monitors applied to patient upon arrival. Report received from GI team at bedside     1128- Criteria met for patient to discharge from Phase II at this time     1131- Home going instructions went over with patient and patient's family member. Educated on when to follow up with provider. All questions answered and patient and patient's family member verified understanding verbally.    1134- IV removed at this time with IV catheter tip intact upon removal    1142- Patient transported to [facility] at this time in stable condition and with all belongings via wheelchair.

## 2024-12-04 LAB
LAB AP ASR DISCLAIMER: NORMAL
LABORATORY COMMENT REPORT: NORMAL
PATH REPORT.FINAL DX SPEC: NORMAL
PATH REPORT.GROSS SPEC: NORMAL
PATH REPORT.RELEVANT HX SPEC: NORMAL
PATH REPORT.TOTAL CANCER: NORMAL

## 2024-12-06 ENCOUNTER — HOSPITAL ENCOUNTER (OUTPATIENT)
Dept: RADIOLOGY | Facility: CLINIC | Age: 69
Discharge: HOME | End: 2024-12-06
Payer: MEDICARE

## 2024-12-06 DIAGNOSIS — R41.3 MEMORY DEFICIT: ICD-10-CM

## 2024-12-06 DIAGNOSIS — R10.13 EPIGASTRIC PAIN: ICD-10-CM

## 2024-12-06 PROCEDURE — 70450 CT HEAD/BRAIN W/O DYE: CPT

## 2024-12-06 PROCEDURE — 2550000001 HC RX 255 CONTRASTS: Performed by: INTERNAL MEDICINE

## 2024-12-06 PROCEDURE — 74177 CT ABD & PELVIS W/CONTRAST: CPT

## 2024-12-09 ENCOUNTER — TELEPHONE (OUTPATIENT)
Dept: PRIMARY CARE | Facility: CLINIC | Age: 69
End: 2024-12-09
Payer: MEDICARE

## 2024-12-09 DIAGNOSIS — K20.90 ESOPHAGITIS: Primary | ICD-10-CM

## 2024-12-09 LAB
LAB AP ASR DISCLAIMER: NORMAL
LABORATORY COMMENT REPORT: NORMAL
PATH REPORT.ADDENDUM SPEC: NORMAL
PATH REPORT.FINAL DX SPEC: NORMAL
PATH REPORT.GROSS SPEC: NORMAL
PATH REPORT.RELEVANT HX SPEC: NORMAL
PATH REPORT.TOTAL CANCER: NORMAL

## 2024-12-09 RX ORDER — OMEPRAZOLE 20 MG/1
20 CAPSULE, DELAYED RELEASE ORAL 2 TIMES DAILY
Qty: 60 CAPSULE | Refills: 0 | Status: SHIPPED | OUTPATIENT
Start: 2024-12-09 | End: 2025-01-08

## 2024-12-09 NOTE — TELEPHONE ENCOUNTER
Gale tpt know that his ct scan did not show anything significant in his abdomen. But since the EGD showed some inflammation in his bowel and esophagus, I have sent a prescription for omeprazole. 20mg take twice a day for one month.  If not better, will need to see GI doctor.    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

## 2024-12-09 NOTE — TELEPHONE ENCOUNTER
Patient call wondering if you can be able to give him a call to review his CT abdomen pelvis w IV contrast. Patient still experiencing abdominal pain and is affecting his eating. Please and thank you.

## 2025-01-02 ENCOUNTER — OFFICE VISIT (OUTPATIENT)
Dept: PRIMARY CARE | Facility: CLINIC | Age: 70
End: 2025-01-02
Payer: MEDICARE

## 2025-01-02 VITALS
DIASTOLIC BLOOD PRESSURE: 82 MMHG | OXYGEN SATURATION: 98 % | WEIGHT: 172 LBS | HEART RATE: 65 BPM | SYSTOLIC BLOOD PRESSURE: 136 MMHG | BODY MASS INDEX: 25.48 KG/M2 | TEMPERATURE: 97.5 F | HEIGHT: 69 IN

## 2025-01-02 DIAGNOSIS — R19.7 DIARRHEA, UNSPECIFIED TYPE: ICD-10-CM

## 2025-01-02 DIAGNOSIS — R10.32 LEFT LOWER QUADRANT ABDOMINAL PAIN: Primary | ICD-10-CM

## 2025-01-02 PROCEDURE — 3075F SYST BP GE 130 - 139MM HG: CPT | Performed by: INTERNAL MEDICINE

## 2025-01-02 PROCEDURE — 3008F BODY MASS INDEX DOCD: CPT | Performed by: INTERNAL MEDICINE

## 2025-01-02 PROCEDURE — 1036F TOBACCO NON-USER: CPT | Performed by: INTERNAL MEDICINE

## 2025-01-02 PROCEDURE — 1158F ADVNC CARE PLAN TLK DOCD: CPT | Performed by: INTERNAL MEDICINE

## 2025-01-02 PROCEDURE — 1123F ACP DISCUSS/DSCN MKR DOCD: CPT | Performed by: INTERNAL MEDICINE

## 2025-01-02 PROCEDURE — 99214 OFFICE O/P EST MOD 30 MIN: CPT | Performed by: INTERNAL MEDICINE

## 2025-01-02 PROCEDURE — G2211 COMPLEX E/M VISIT ADD ON: HCPCS | Performed by: INTERNAL MEDICINE

## 2025-01-02 PROCEDURE — 3079F DIAST BP 80-89 MM HG: CPT | Performed by: INTERNAL MEDICINE

## 2025-01-02 PROCEDURE — 1159F MED LIST DOCD IN RCRD: CPT | Performed by: INTERNAL MEDICINE

## 2025-01-02 NOTE — PROGRESS NOTES
"PCP: Diana Saez MD   I have reviewed existing histories, notes, past medical history, surgical history, social history, family history, med list, allergy list, and immunization, and updated.    HPI:   Pt returns with his sister.  He Complains of  left lower Abdominal pain, for past 3 weeks, it is bad, almost constant, is not tender when he presses on it. Also has had diarrhea, loose stools 3x per day, for past 3 weeks. Stool is dark.   He has been taking Pepto bismol.  GERD is not too bad at this time. He is no longer taking omeprazole    He does not have taste to food except for sweet such as cookies and cakes. But does eat regular food despite having no taste. They do not make him nauseated. No vomit  He had lost some weight prior to last visit but no further Weight loss since last visit.   He said he continued to lose weight.  No Fever and chills, no sob cp leg edema.  No Numbness, tingling or weakness anywhere.  No back pain or radiation pain from mid back to front.  No skin rash  Had EGD in November. Owed mild inflammation.  Colonoscopy 3 years ago, diverticulosis.  Had ct abdomen last month, no significant findings.  Had CT head last month as well, no significant findings.    Saw GI right before EGD.      He no longer drinks  He continued to work       Lab Results   Component Value Date    WBC 6.9 11/13/2024    HGB 14.6 11/13/2024    HCT 43.1 11/13/2024     11/13/2024    CHOL 133 11/13/2023    TRIG 128 11/13/2023    HDL 45.9 11/13/2023    ALT 30 11/19/2024    AST 22 11/19/2024     11/19/2024    K 4.4 11/19/2024     11/19/2024    CREATININE 1.00 11/19/2024    BUN 11 11/19/2024    CO2 29 11/19/2024    TSH 2.29 11/13/2024    PSA 1.02 04/08/2021    HGBA1C 5.9 (H) 11/13/2024     Physical exam:  /82   Pulse 65   Temp 36.4 °C (97.5 °F)   Ht 1.753 m (5' 9\")   Wt 78 kg (172 lb)   SpO2 98%   BMI 25.40 kg/m²    Patient appears well, alert and oriented x 3, cooperative. No depression or " anxiety.   Vitals are as documented.  Neck is supple and free of adenopathy, or masses. No thyromegaly.   PERRL, extra eye muscles are intact.  Ears, throat are normal.  Heart sounds are normal, no murmur, gallops or rubs.   Lungs are clear to auscultation    Abdomen is soft, no tenderness, masses or organomegaly. He said he has pain at LLQ, but not tender  Extremities are normal. Peripheral pulses are normal.   Neurologic exam is normal without focal findings.   Skin is normal without suspicious lesions noted.   No acute joint swelling or pain.      Assessments/orders:   Assessment & Plan  Left lower quadrant abdominal pain         Diarrhea, unspecified type    Orders:    C. difficile, PCR; Future    Giardia/Cryptosporidium Antigens; Future    Stool Pathogen Panel, PCR; Future      Please follow up with GI for continued GI symptoms. He will call Dr. Walker

## 2025-01-07 ENCOUNTER — LAB (OUTPATIENT)
Dept: LAB | Facility: LAB | Age: 70
End: 2025-01-07
Payer: MEDICARE

## 2025-01-07 DIAGNOSIS — R19.7 DIARRHEA, UNSPECIFIED TYPE: ICD-10-CM

## 2025-01-07 PROCEDURE — 87493 C DIFF AMPLIFIED PROBE: CPT

## 2025-01-07 PROCEDURE — 87329 GIARDIA AG IA: CPT

## 2025-01-07 PROCEDURE — 87506 IADNA-DNA/RNA PROBE TQ 6-11: CPT

## 2025-01-07 PROCEDURE — 87328 CRYPTOSPORIDIUM AG IA: CPT

## 2025-01-08 ENCOUNTER — APPOINTMENT (OUTPATIENT)
Dept: PRIMARY CARE | Facility: CLINIC | Age: 70
End: 2025-01-08
Payer: MEDICARE

## 2025-01-08 LAB
C COLI+JEJ+UPSA DNA STL QL NAA+PROBE: NOT DETECTED
C DIF TOX TCDA+TCDB STL QL NAA+PROBE: NOT DETECTED
EC STX1 GENE STL QL NAA+PROBE: NOT DETECTED
EC STX2 GENE STL QL NAA+PROBE: NOT DETECTED
NOROVIRUS GI + GII RNA STL NAA+PROBE: NOT DETECTED
RV RNA STL NAA+PROBE: NOT DETECTED
SALMONELLA DNA STL QL NAA+PROBE: NOT DETECTED
SHIGELLA DNA SPEC QL NAA+PROBE: NOT DETECTED
V CHOLERAE DNA STL QL NAA+PROBE: NOT DETECTED
Y ENTEROCOL DNA STL QL NAA+PROBE: NOT DETECTED

## 2025-01-10 LAB
CRYPTOSP AG STL QL IA: NEGATIVE
G LAMBLIA AG STL QL IA: POSITIVE

## 2025-01-13 DIAGNOSIS — A07.1 GIARDIAL COLITIS: Primary | ICD-10-CM

## 2025-01-13 RX ORDER — METRONIDAZOLE 500 MG/1
500 TABLET ORAL 2 TIMES DAILY
Qty: 14 TABLET | Refills: 0 | Status: SHIPPED | OUTPATIENT
Start: 2025-01-13 | End: 2025-01-20

## 2025-01-16 ENCOUNTER — APPOINTMENT (OUTPATIENT)
Dept: GASTROENTEROLOGY | Facility: CLINIC | Age: 70
End: 2025-01-16
Payer: MEDICARE

## 2025-01-16 DIAGNOSIS — K20.90 ESOPHAGITIS: ICD-10-CM

## 2025-01-16 DIAGNOSIS — K21.9 GASTROESOPHAGEAL REFLUX DISEASE, UNSPECIFIED WHETHER ESOPHAGITIS PRESENT: ICD-10-CM

## 2025-01-16 DIAGNOSIS — A07.1 GIARDIA: Primary | ICD-10-CM

## 2025-01-16 PROCEDURE — 99203 OFFICE O/P NEW LOW 30 MIN: CPT

## 2025-01-16 PROCEDURE — 1123F ACP DISCUSS/DSCN MKR DOCD: CPT

## 2025-01-16 RX ORDER — OMEPRAZOLE 20 MG/1
20 CAPSULE, DELAYED RELEASE ORAL 2 TIMES DAILY PRN
Qty: 60 CAPSULE | Refills: 11 | Status: SHIPPED | OUTPATIENT
Start: 2025-01-16 | End: 2026-01-11

## 2025-01-16 ASSESSMENT — ENCOUNTER SYMPTOMS
ABDOMINAL DISTENTION: 0
SHORTNESS OF BREATH: 0
CONSTIPATION: 0
NAUSEA: 0
COUGH: 0
APPETITE CHANGE: 1
RECTAL PAIN: 0
CHILLS: 0
ANAL BLEEDING: 0
ABDOMINAL PAIN: 1
FEVER: 0
TROUBLE SWALLOWING: 0
FATIGUE: 0
VOMITING: 0
DIARRHEA: 1
BLOOD IN STOOL: 0

## 2025-01-16 NOTE — PROGRESS NOTES
Subjective     History of Present Illness:   Douglas Domingo is a 69 y.o. male with PMHx of HTN, HLD, laryngeal pharyngeal reflux who presents to GI clinic for further evaluation of stomach pains    Today,    Prescribed 20 mg daily Prilosec  12/2024 CT abdomen pelvis negative.  1/2025 positive Giardia, prescribed Flagyl by other provider.  patient states since November, he has had mid abdominal cramping pain and bloating. Had constant diarrhea liquid to loose 3-5 times daily up until a few weeks ago.  Has had decreased appetite.  Lost 8 lbs over this time. Initially had horrible acid reflux and it got better with prilosec. Takes NSAIDs a few times a week for soreness from exercise.  Over the past few weeks his bowel movements have been more formed and less frequent, 1-2 times daily.  He consistently took Prilosec for short period of time and now has been taking as needed, but notices acid reflux.  States he cannot drink water and the only water he drinks is with his coffee out of the faucet. Drinks Gatorade instead.  Exercises regularly.  Has been trying to drink ginger ale and carbonated water.  States he usually eats 1 large meal a day and has never been a big eater.  States he just started taking his Flagyl and does not notice a huge difference yet.  Denies constipation, dyspepsia, melena, hematochezia, dysphagia    Social ETOH, denies smoking or marijuana  Denies fxh GI cancer or IBD  Abdominal Surgeries: denies    Last colonoscopy 7/2021 Dr. Alatorre for screening: Sigmoid diverticulosis, internal hemorrhoids.  Repeat 10 years  Last EGD 11/2024 for epigastric pain and GERD: Mild erythematous mucosa stomach and antrum, lipolysis of duodenal mucosa, grade a esophagitis with mucus breaks lower third of esophagus.  Pathology: Negative H. pylori      Past Medical History  As per HPI.     Social History  he  reports that he has never smoked. He has never used smokeless tobacco. He reports current alcohol use of about  10.0 standard drinks of alcohol per week. He reports that he does not use drugs.     Family History  his family history is not on file.     Review of Systems  Review of Systems   Constitutional:  Positive for appetite change. Negative for chills, fatigue and fever.   HENT:  Negative for trouble swallowing.    Respiratory:  Negative for cough and shortness of breath.    Gastrointestinal:  Positive for abdominal pain (Mid abdomen) and diarrhea (Previous). Negative for abdominal distention, anal bleeding, blood in stool, constipation, nausea, rectal pain and vomiting.       Allergies  No Known Allergies    Medications  Current Outpatient Medications   Medication Instructions    aspirin 81 mg EC tablet Daily RT    cholecalciferol, vitamin D3, (VITAMIN D3 ORAL) Take by mouth.    HYDROcodone-acetaminophen (Norco) 5-325 mg tablet     losartan (COZAAR) 50 mg, oral, Daily    metoprolol succinate XL (TOPROL-XL) 25 mg, oral, Daily    metroNIDAZOLE (FLAGYL) 500 mg, oral, 2 times daily, DO NOT DRINK ALCOHOL WHILE TAKING THIS MED    omeprazole (PRILOSEC) 20 mg, oral, 2 times daily PRN, Do not crush or chew.    rosuvastatin (CRESTOR) 40 mg, oral, Daily    triamcinolone (Kenalog) 0.1 % cream Topical, 2 times daily        Objective   There were no vitals taken for this visit.   Physical Exam  Constitutional:       Appearance: Normal appearance. He is normal weight.   HENT:      Mouth/Throat:      Mouth: Mucous membranes are dry.      Pharynx: Oropharynx is clear.   Cardiovascular:      Rate and Rhythm: Normal rate and regular rhythm.   Pulmonary:      Effort: Pulmonary effort is normal.      Breath sounds: Normal breath sounds. No wheezing or rhonchi.   Abdominal:      General: Abdomen is flat. Bowel sounds are normal. There is no distension.      Palpations: Abdomen is soft. There is no hepatomegaly.      Tenderness: There is no abdominal tenderness. There is no guarding or rebound. Negative signs include Tran's sign.       Hernia: No hernia is present.   Musculoskeletal:         General: Normal range of motion.   Skin:     General: Skin is warm and dry.   Neurological:      General: No focal deficit present.      Mental Status: He is alert and oriented to person, place, and time.   Psychiatric:         Mood and Affect: Mood normal.         Behavior: Behavior normal.           Lab Results   Component Value Date    WBC 6.9 11/13/2024    WBC 6.8 11/13/2023    WBC 6.2 09/02/2022    HGB 14.6 11/13/2024    HGB 15.1 11/13/2023    HGB 14.1 09/02/2022    HCT 43.1 11/13/2024    HCT 45.5 11/13/2023    HCT 41.8 09/02/2022     11/13/2024     11/13/2023     09/02/2022     Lab Results   Component Value Date     11/19/2024     11/13/2023     09/02/2022    K 4.4 11/19/2024    K 4.5 11/13/2023    K 4.6 09/02/2022     11/19/2024     11/13/2023     09/02/2022    CO2 29 11/19/2024    CO2 30 11/13/2023    CO2 29 09/02/2022    BUN 11 11/19/2024    BUN 12 11/13/2023    BUN 21 09/02/2022    CREATININE 1.00 11/19/2024    CREATININE 1.03 11/13/2023    CREATININE 1.03 09/02/2022    CALCIUM 9.7 11/19/2024    CALCIUM 10.2 11/13/2023    CALCIUM 9.8 09/02/2022    PROT 7.5 11/19/2024    PROT 7.7 11/13/2023    PROT 7.0 09/02/2022    BILITOT 0.5 11/19/2024    BILITOT 0.8 11/13/2023    BILITOT 0.6 09/02/2022    ALKPHOS 53 11/19/2024    ALKPHOS 44 11/13/2023    ALKPHOS 47 09/02/2022    ALT 30 11/19/2024    ALT 26 11/13/2023    ALT 21 09/02/2022    AST 22 11/19/2024    AST 20 11/13/2023    AST 18 09/02/2022    GLUCOSE 111 (H) 11/19/2024    GLUCOSE 94 11/13/2023    GLUCOSE 106 (H) 09/02/2022           Douglas Domingo is a 69 y.o. male who presents to GI clinic for Diarrhea, abdominal pain and GERD.    Giardia  Patient currently taking Flagyl to treat this.    -Recommended patient contact me if not better once finishing full course, then will consider Tinidazole 2 g once    Gastroesophageal reflux disease  Patient  appears to have chronic GERD which has worsened recently  -Recommended patient continue 20 to 40 mg omeprazole until feeling better, then recommended patient take 20 mg omeprazole daily or when he is taking NSAIDs, which is several times a week.    Follow-up annually or as needed         Nelda aHskins, APRN-CNP

## 2025-01-16 NOTE — ASSESSMENT & PLAN NOTE
Patient currently taking Flagyl to treat this.    -Recommended patient contact me if not better once finishing full course, then will consider Tinidazole 2 g once

## 2025-01-16 NOTE — PATIENT INSTRUCTIONS
I recommend you finish your Flagy, then notify me if you still have symptoms  For now take daily prilosec until you are better.  Whenever you take NSAIDs in the future, I recommend taking it   Universal Safety Interventions

## 2025-01-16 NOTE — ASSESSMENT & PLAN NOTE
Patient appears to have chronic GERD which has worsened recently  -Recommended patient continue 20 to 40 mg omeprazole until feeling better, then recommended patient take 20 mg omeprazole daily or when he is taking NSAIDs, which is several times a week.    Follow-up annually or as needed

## 2025-01-20 DIAGNOSIS — A07.1 GIARDIAL COLITIS: ICD-10-CM

## 2025-01-20 RX ORDER — METRONIDAZOLE 500 MG/1
500 TABLET ORAL 2 TIMES DAILY
Qty: 14 TABLET | Refills: 0 | Status: SHIPPED | OUTPATIENT
Start: 2025-01-20 | End: 2025-01-27

## 2025-01-21 ENCOUNTER — OFFICE VISIT (OUTPATIENT)
Dept: OPHTHALMOLOGY | Facility: CLINIC | Age: 70
End: 2025-01-21
Payer: MEDICARE

## 2025-01-21 DIAGNOSIS — H52.03 HYPEROPIA OF BOTH EYES WITH ASTIGMATISM AND PRESBYOPIA: ICD-10-CM

## 2025-01-21 DIAGNOSIS — H52.4 HYPEROPIA OF BOTH EYES WITH ASTIGMATISM AND PRESBYOPIA: ICD-10-CM

## 2025-01-21 DIAGNOSIS — H18.533: ICD-10-CM

## 2025-01-21 DIAGNOSIS — H43.813 PVD (POSTERIOR VITREOUS DETACHMENT), BOTH EYES: Primary | ICD-10-CM

## 2025-01-21 DIAGNOSIS — H52.203 HYPEROPIA OF BOTH EYES WITH ASTIGMATISM AND PRESBYOPIA: ICD-10-CM

## 2025-01-21 PROCEDURE — 92015 DETERMINE REFRACTIVE STATE: CPT | Performed by: STUDENT IN AN ORGANIZED HEALTH CARE EDUCATION/TRAINING PROGRAM

## 2025-01-21 PROCEDURE — 99214 OFFICE O/P EST MOD 30 MIN: CPT | Performed by: STUDENT IN AN ORGANIZED HEALTH CARE EDUCATION/TRAINING PROGRAM

## 2025-01-21 ASSESSMENT — ENCOUNTER SYMPTOMS
HEMATOLOGIC/LYMPHATIC NEGATIVE: 0
RESPIRATORY NEGATIVE: 0
EYES NEGATIVE: 1
ENDOCRINE NEGATIVE: 0
NEUROLOGICAL NEGATIVE: 0
ALLERGIC/IMMUNOLOGIC NEGATIVE: 0
CONSTITUTIONAL NEGATIVE: 0
MUSCULOSKELETAL NEGATIVE: 0
CARDIOVASCULAR NEGATIVE: 0
GASTROINTESTINAL NEGATIVE: 0
PSYCHIATRIC NEGATIVE: 0

## 2025-01-21 ASSESSMENT — CONF VISUAL FIELD
OD_SUPERIOR_NASAL_RESTRICTION: 0
OS_SUPERIOR_TEMPORAL_RESTRICTION: 0
OD_INFERIOR_TEMPORAL_RESTRICTION: 0
OS_NORMAL: 1
OS_SUPERIOR_NASAL_RESTRICTION: 0
OD_INFERIOR_NASAL_RESTRICTION: 0
OS_INFERIOR_TEMPORAL_RESTRICTION: 0
OS_INFERIOR_NASAL_RESTRICTION: 0
OD_SUPERIOR_TEMPORAL_RESTRICTION: 0
OD_NORMAL: 1

## 2025-01-21 ASSESSMENT — TONOMETRY
IOP_METHOD: GOLDMANN APPLANATION
OD_IOP_MMHG: 18
OS_IOP_MMHG: 19

## 2025-01-21 ASSESSMENT — CUP TO DISC RATIO
OS_RATIO: 0.35
OD_RATIO: 0.4

## 2025-01-21 ASSESSMENT — SLIT LAMP EXAM - LIDS
COMMENTS: TR MGD
COMMENTS: TR MGD

## 2025-01-21 ASSESSMENT — REFRACTION_WEARINGRX
SPECS_TYPE: PAL
OS_CYLINDER: SPHERE
OD_SPHERE: +1.50
OD_ADD: +2.50
OS_ADD: +2.50
OS_SPHERE: +1.25
OD_AXIS: 160
OD_CYLINDER: -1.00

## 2025-01-21 ASSESSMENT — REFRACTION_MANIFEST
OD_AXIS: 160
OS_CYLINDER: SPHERE
OD_CYLINDER: -0.75
OD_SPHERE: +1.50
OD_ADD: +2.50
OS_SPHERE: +1.50
OS_ADD: +2.50

## 2025-01-21 ASSESSMENT — VISUAL ACUITY
OD_CC+: +1
OD_PH_CC+: +2
OS_PH_CC: 20/25
OD_PH_CC: 20/30
CORRECTION_TYPE: GLASSES
METHOD: SNELLEN - LINEAR
OS_PH_CC+: -2
OS_CC+: +2
OS_CC: 20/30
OD_CC: 20/40

## 2025-01-21 NOTE — PROGRESS NOTES
"Assessment/Plan   Diagnoses and all orders for this visit:  PVD (posterior vitreous detachment), both eyes - Initial   -New c/o \"spiderwebbing\" in vision, onset 01/17/2024  -Confirmed on DFE today no retinal tear/holes/breaks at this time   -symptoms started OU-now only OS  -discussed etiology for patient today  -Discussed signs and symtpoms of retinal hole, tear, detachment. Patient educated that retinal detachment can lead to permanent vision loss. Patient consents to return if they notice new floaters, flashes, curtain or veil covering vision.  -f/u with repeat DFE 6 weeks or sooner prn for any changes to symptoms    Hyperopia of both eyes with astigmatism and presbyopia  Blurred vision  -New spec rx released today per patient request. Ocular health wnl for age OU. Monitor 1 year or sooner prn. Refraction billed today. Pt consents to receiving glasses Rx today. Patient's/guardian's signature obtained to acknowledge and confirm that a paper copy of glasses Rx was given to patient in compliance with Pending sale to [facility] Rule. Electronic copy of Rx will also be available via [employer]  -mild changes to RX     Avellino corneal dystrophy of both eyes  -Pt was told at last full eye appointment outside of  Hospitals that he had a corneal dystrophy.  -Ed on etiology and findings today  -Pt was advised to start using Freshkote or Theratears 3-4x/day as well as ointment at night like Systane or Gentel in both eyes. Warm compresses were also recommended.  -If vision or RCE symptoms develop/worsen consider putting on Restasis, Doxy, and FML for short term use. Could also consider amniotic membrane/Cam 360.     RTC in 6 wks for DFE to observe PVD  "

## 2025-01-23 ENCOUNTER — APPOINTMENT (OUTPATIENT)
Dept: PRIMARY CARE | Facility: CLINIC | Age: 70
End: 2025-01-23
Payer: MEDICARE

## 2025-01-27 ENCOUNTER — APPOINTMENT (OUTPATIENT)
Dept: PRIMARY CARE | Facility: CLINIC | Age: 70
End: 2025-01-27
Payer: MEDICARE

## 2025-01-28 ENCOUNTER — APPOINTMENT (OUTPATIENT)
Dept: OPHTHALMOLOGY | Facility: CLINIC | Age: 70
End: 2025-01-28
Payer: MEDICARE

## 2025-02-28 ENCOUNTER — APPOINTMENT (OUTPATIENT)
Facility: CLINIC | Age: 70
End: 2025-02-28
Payer: MEDICARE

## 2025-03-03 DIAGNOSIS — I10 BENIGN ESSENTIAL HTN: ICD-10-CM

## 2025-03-03 RX ORDER — METOPROLOL SUCCINATE 25 MG/1
25 TABLET, EXTENDED RELEASE ORAL DAILY
Qty: 90 TABLET | Refills: 0 | Status: SHIPPED | OUTPATIENT
Start: 2025-03-03

## 2025-03-04 ENCOUNTER — APPOINTMENT (OUTPATIENT)
Dept: OPHTHALMOLOGY | Facility: CLINIC | Age: 70
End: 2025-03-04
Payer: MEDICARE

## 2025-03-04 DIAGNOSIS — H43.813 PVD (POSTERIOR VITREOUS DETACHMENT), BOTH EYES: Primary | ICD-10-CM

## 2025-03-04 PROCEDURE — 99213 OFFICE O/P EST LOW 20 MIN: CPT | Performed by: STUDENT IN AN ORGANIZED HEALTH CARE EDUCATION/TRAINING PROGRAM

## 2025-03-04 ASSESSMENT — CONF VISUAL FIELD
OS_NORMAL: 1
OD_SUPERIOR_NASAL_RESTRICTION: 0
OS_SUPERIOR_NASAL_RESTRICTION: 0
OD_INFERIOR_NASAL_RESTRICTION: 0
OS_SUPERIOR_TEMPORAL_RESTRICTION: 0
OD_NORMAL: 1
OS_INFERIOR_NASAL_RESTRICTION: 0
OD_SUPERIOR_TEMPORAL_RESTRICTION: 0
OS_INFERIOR_TEMPORAL_RESTRICTION: 0
OD_INFERIOR_TEMPORAL_RESTRICTION: 0

## 2025-03-04 ASSESSMENT — ENCOUNTER SYMPTOMS
HEMATOLOGIC/LYMPHATIC NEGATIVE: 0
ALLERGIC/IMMUNOLOGIC NEGATIVE: 0
PSYCHIATRIC NEGATIVE: 0
MUSCULOSKELETAL NEGATIVE: 0
RESPIRATORY NEGATIVE: 0
EYES NEGATIVE: 0
CONSTITUTIONAL NEGATIVE: 0
ENDOCRINE NEGATIVE: 0
GASTROINTESTINAL NEGATIVE: 0
NEUROLOGICAL NEGATIVE: 0
CARDIOVASCULAR NEGATIVE: 0

## 2025-03-04 ASSESSMENT — CUP TO DISC RATIO
OD_RATIO: 0.4
OS_RATIO: 0.35

## 2025-03-04 ASSESSMENT — TONOMETRY
OD_IOP_MMHG: 20
IOP_METHOD: TONOPEN
OS_IOP_MMHG: 21

## 2025-03-04 ASSESSMENT — SLIT LAMP EXAM - LIDS
COMMENTS: TR MGD
COMMENTS: TR MGD

## 2025-03-04 ASSESSMENT — VISUAL ACUITY
OD_CC: 20/30-1
METHOD: SNELLEN - LINEAR
OD_PH_CC: 20/25-2
CORRECTION_TYPE: GLASSES
OS_CC: 20/25-2

## 2025-03-04 NOTE — PROGRESS NOTES
"Assessment/Plan   Diagnoses and all orders for this visit:  PVD (posterior vitreous detachment), both eyes - F/u  -New c/o \"spiderwebbing\" in vision, onset 01/17/2024; today ~6 week f/u to monitor  -Confirmed on DFE-no retinal tear/holes/breaks at this time   -patient reports symptoms have resolved OU  -discussed etiology for patient today  -Discussed signs and symtpoms of retinal hole, tear, detachment. Patient educated that retinal detachment can lead to permanent vision loss. Patient consents to return if they notice new floaters, flashes, curtain or veil covering vision.  -monitor    Avellino corneal dystrophy of both eyes  -Pt was told at last full eye appointment outside of  Hospitals that he had a corneal dystrophy.  -Ed on etiology and findings today  -Pt was advised to start using Freshkote or Theratears 3-4x/day as well as ointment at night like Systane or Gentel in both eyes. Warm compresses were also recommended.  -If vision or RCE symptoms develop/worsen consider putting on Restasis, Doxy, and FML for short term use. Could also consider amniotic membrane/Cam 360.     RTC 9/2025 for annual exam with DFE and MRX   "

## 2025-03-25 ENCOUNTER — APPOINTMENT (OUTPATIENT)
Dept: OPHTHALMOLOGY | Facility: CLINIC | Age: 70
End: 2025-03-25
Payer: COMMERCIAL

## 2025-04-15 ENCOUNTER — OFFICE VISIT (OUTPATIENT)
Dept: PRIMARY CARE | Facility: CLINIC | Age: 70
End: 2025-04-15
Payer: MEDICARE

## 2025-04-15 VITALS
HEIGHT: 68 IN | WEIGHT: 174 LBS | DIASTOLIC BLOOD PRESSURE: 61 MMHG | TEMPERATURE: 98.1 F | BODY MASS INDEX: 26.37 KG/M2 | SYSTOLIC BLOOD PRESSURE: 99 MMHG | HEART RATE: 66 BPM | OXYGEN SATURATION: 97 %

## 2025-04-15 DIAGNOSIS — M25.572 ACUTE LEFT ANKLE PAIN: Primary | ICD-10-CM

## 2025-04-15 PROCEDURE — 3078F DIAST BP <80 MM HG: CPT | Performed by: INTERNAL MEDICINE

## 2025-04-15 PROCEDURE — 1123F ACP DISCUSS/DSCN MKR DOCD: CPT | Performed by: INTERNAL MEDICINE

## 2025-04-15 PROCEDURE — 3008F BODY MASS INDEX DOCD: CPT | Performed by: INTERNAL MEDICINE

## 2025-04-15 PROCEDURE — 99213 OFFICE O/P EST LOW 20 MIN: CPT | Performed by: INTERNAL MEDICINE

## 2025-04-15 PROCEDURE — 3074F SYST BP LT 130 MM HG: CPT | Performed by: INTERNAL MEDICINE

## 2025-04-15 NOTE — PROGRESS NOTES
"PCP: Diana Saez MD   I have reviewed existing histories, notes, past medical history, surgical history, social history, family history, med list, allergy list, and immunization, and updated.    HPI:   Pt has chronic left ankle/foot problem, hx of multiple sprains, fracture, bone spur. Periodically pain flares up. This time the pain feels different, it is at inner left ankle, felt something might pop.   He is very active physically. Still working.    Lab Results   Component Value Date    WBC 6.9 11/13/2024    HGB 14.6 11/13/2024    HCT 43.1 11/13/2024     11/13/2024    CHOL 133 11/13/2023    TRIG 128 11/13/2023    HDL 45.9 11/13/2023    ALT 30 11/19/2024    AST 22 11/19/2024     11/19/2024    K 4.4 11/19/2024     11/19/2024    CREATININE 1.00 11/19/2024    BUN 11 11/19/2024    CO2 29 11/19/2024    TSH 2.29 11/13/2024    PSA 1.02 04/08/2021    HGBA1C 5.9 (H) 11/13/2024     Physical exam:  BP 99/61   Pulse 66   Temp 36.7 °C (98.1 °F)   Ht 1.727 m (5' 8\")   Wt 78.9 kg (174 lb)   SpO2 97%   BMI 26.46 kg/m²     No tenderness on spine  No skin rash on back.  No focal neurologic deficit  left heel not tender, no erythema   Pedal pulses normal     Assessments/orders:   Assessment & Plan  Acute left ankle pain    Orders:    XR ankle left 3+ views; Future    Referral to Orthopedics and Sports Medicine; Future                      "

## 2025-04-16 ENCOUNTER — HOSPITAL ENCOUNTER (OUTPATIENT)
Dept: RADIOLOGY | Facility: CLINIC | Age: 70
Discharge: HOME | End: 2025-04-16
Payer: MEDICARE

## 2025-04-16 DIAGNOSIS — M25.572 ACUTE LEFT ANKLE PAIN: ICD-10-CM

## 2025-04-16 PROCEDURE — 73610 X-RAY EXAM OF ANKLE: CPT | Mod: LT

## 2025-04-19 ENCOUNTER — OFFICE VISIT (OUTPATIENT)
Dept: URGENT CARE | Age: 70
End: 2025-04-19
Payer: MEDICARE

## 2025-04-19 VITALS
RESPIRATION RATE: 17 BRPM | SYSTOLIC BLOOD PRESSURE: 129 MMHG | WEIGHT: 172 LBS | HEART RATE: 59 BPM | DIASTOLIC BLOOD PRESSURE: 69 MMHG | OXYGEN SATURATION: 99 % | BODY MASS INDEX: 26.15 KG/M2 | TEMPERATURE: 97.9 F

## 2025-04-19 DIAGNOSIS — S39.012A STRAIN OF LUMBAR REGION, INITIAL ENCOUNTER: Primary | ICD-10-CM

## 2025-04-19 RX ORDER — NALOXONE HYDROCHLORIDE 4 MG/.1ML
1 SPRAY NASAL AS NEEDED
Qty: 2 EACH | Refills: 0 | Status: SHIPPED | OUTPATIENT
Start: 2025-04-19

## 2025-04-19 RX ORDER — METHOCARBAMOL 500 MG/1
500 TABLET, FILM COATED ORAL 4 TIMES DAILY PRN
Qty: 30 TABLET | Refills: 0 | Status: SHIPPED | OUTPATIENT
Start: 2025-04-19 | End: 2025-04-29

## 2025-04-19 ASSESSMENT — PAIN SCALES - GENERAL: PAINLEVEL_OUTOF10: 10-WORST PAIN EVER

## 2025-04-19 ASSESSMENT — ENCOUNTER SYMPTOMS
BLOOD IN STOOL: 0
BACK PAIN: 1
DYSURIA: 0
FREQUENCY: 0
APPETITE CHANGE: 0
NUMBNESS: 0
JOINT SWELLING: 0
FATIGUE: 0
WEAKNESS: 0
FLANK PAIN: 0
ACTIVITY CHANGE: 0
HEMATURIA: 0
CONSTIPATION: 0
NECK PAIN: 0
DIFFICULTY URINATING: 0

## 2025-04-19 NOTE — PATIENT INSTRUCTIONS
Moist heat to the sore area, range of motion as discussed.  Take muscle relaxer as directed.  Follow up with ortho as scheduled

## 2025-04-19 NOTE — PROGRESS NOTES
Subjective   Patient ID: Douglas Dmoingo is a 69 y.o. male. They present today with a chief complaint of Back Pain (Lumbar pain, x4 days. No known injury. Has experienced this before.).    History of Present Illness  68 YO M c/o lower back pain x 4 days.  No known injury.  He is wearing a back brace as this has happened before, just not improving as quickly.  Feels his leg will give out sometimes.  He is still working and exercising       Back Pain  Pertinent negatives include no dysuria, numbness or weakness.       Past Medical History  Allergies as of 04/19/2025    (No Known Allergies)       Prescriptions Prior to Admission[1]     Medical History[2]    Surgical History[3]     reports that he has never smoked. He has never used smokeless tobacco. He reports current alcohol use of about 10.0 standard drinks of alcohol per week. He reports that he does not use drugs.    Review of Systems  Review of Systems   Constitutional:  Negative for activity change, appetite change and fatigue.   Gastrointestinal:  Negative for blood in stool and constipation.   Genitourinary:  Negative for difficulty urinating, dysuria, flank pain, frequency and hematuria.   Musculoskeletal:  Positive for back pain and gait problem. Negative for joint swelling and neck pain.   Skin:  Negative for rash.   Neurological:  Negative for weakness and numbness.                                  Objective    Vitals:    04/19/25 1245   BP: 129/69   BP Location: Right arm   Patient Position: Sitting   BP Cuff Size: Adult   Pulse: 59   Resp: 17   Temp: 36.6 °C (97.9 °F)   TempSrc: Oral   SpO2: 99%   Weight: 78 kg (172 lb)     No LMP for male patient.    Physical Exam  Vitals and nursing note reviewed.   Constitutional:       Appearance: Normal appearance.   Cardiovascular:      Rate and Rhythm: Normal rate and regular rhythm.      Heart sounds: Normal heart sounds.   Pulmonary:      Effort: Pulmonary effort is normal.      Breath sounds: Normal breath  sounds.   Musculoskeletal:         General: Tenderness present. No swelling, deformity or signs of injury.      Cervical back: Normal range of motion and neck supple.      Lumbar back: Spasms and tenderness present. No swelling, deformity or bony tenderness. Decreased range of motion. Negative right straight leg raise test and negative left straight leg raise test.        Back:       Right lower leg: No edema.      Left lower leg: No edema.      Comments: Tenderness to above area no radiculopathy N/V intact    Skin:     General: Skin is warm and dry.   Neurological:      Mental Status: He is alert.         Procedures    Point of Care Test & Imaging Results from this visit  No results found for this visit on 04/19/25.   Imaging  No results found.    Cardiology, Vascular, and Other Imaging  No other imaging results found for the past 2 days      Diagnostic study results (if any) were reviewed by Pamela Ugalde PA-C.    Assessment/Plan   Allergies, medications, history, and pertinent labs/EKGs/Imaging reviewed by Pamela Ugalde PA-C.     Medical Decision Making      Orders and Diagnoses  Diagnoses and all orders for this visit:  Strain of lumbar region, initial encounter  -     methocarbamol (Robaxin) 500 mg tablet; Take 1 tablet (500 mg) by mouth 4 times a day as needed for muscle spasms for up to 10 days.  -     naloxone (Narcan) 4 mg/0.1 mL nasal spray; Administer 1 spray (4 mg) into affected nostril(s) if needed for opioid reversal. May repeat every 2-3 minutes if needed, alternating nostrils, until medical assistance becomes available.      Medical Admin Record      Patient disposition: Home    Electronically signed by Pamela Ugalde PA-C  1:20 PM           [1] (Not in a hospital admission)  [2]   Past Medical History:  Diagnosis Date    COVID-19 03/30/2021    COVID-19 virus infection    Essential (primary) hypertension 08/30/2022    Benign essential HTN    Personal history of other diseases of the  circulatory system     History of hypertension    Personal history of other specified conditions 12/16/2021    History of palpitations    Personal history of other specified conditions     History of chest pain    Pure hypercholesterolemia, unspecified 08/30/2022    Hypercholesteremia   [3]   Past Surgical History:  Procedure Laterality Date    MR HEAD ANGIO WO IV CONTRAST  1/29/2012    MR HEAD ANGIO WO IV CONTRAST [facility]    OTHER SURGICAL HISTORY  03/30/2021    Knee arthroscopy

## 2025-04-22 NOTE — PROGRESS NOTES
Verbal consent of the patient and/or verbal parental consent for patients under the age of 18 have been obtained to conduct a physical examination at this office visit.    The *** was present in the room during the entire visit including, but not limited to the physical examination.    New patient    History Of Present Illness  04/22/25 Douglas Domingo is a 69 y.o. male who presents for an evaluation of their Left ankle.      All previous Progress Notes and imaging results related to this patients chief complaint have been reviewed in preparation for this examination.    Past Medical History  He has a past medical history of COVID-19 (03/30/2021), Essential (primary) hypertension (08/30/2022), Personal history of other diseases of the circulatory system, Personal history of other specified conditions (12/16/2021), Personal history of other specified conditions, and Pure hypercholesterolemia, unspecified (08/30/2022).    Surgical History  He has a past surgical history that includes Other surgical history (03/30/2021) and MR angio head wo IV contrast (1/29/2012).     Social History  He reports that he has never smoked. He has never used smokeless tobacco. He reports current alcohol use of about 10.0 standard drinks of alcohol per week. He reports that he does not use drugs.    Family History  Family History[1]     Allergies  Patient has no known allergies.    Historical Clinical Intake    Review of Systems  CONSTITUTIONAL:   Negative for weight change, loss of appetite, fatigue, weakness, fever, chills, night sweats, headaches .           HEENT:   Negative for cold, cough, sore throat, sinus pain, swollen lymph nodes.           OPHTHALMOLOGY:   Negative for diminished vision, blurred vision, loss of vision, double vision.           ALLERGY:   Negative for runny nose, scratchy throat, sinus congestion, rash, facial pressure, nasal congestion, post-nasal drip.           CARDIOLOGY:   Negative for chest pain,  palpitations, murmurs, irregular heart beat, shortness of breath, leg edema, dyspnea on exertion, fatigue, dizziness.           RESPIRATORY:   Negative for chest pain, shortness of breath, swelling of the legs, asthma/copd, chest congestion, pain with breathing .           GASTROENTEROLOGY:   Negative for nausea, vomitting, heartburn, constipation, diarrhea, blood in stool, change in bowel habits, black stool.           HEMATOLOGY/LYMPH:   Negative for fatigue, loss of appetitie, easy bruising, easy bleeding, anemia, abnormal bleeding, slow healing.           ENDOCRINOLOGY:   Negative for polyuria, polydipsia, polyphagia, fatigue, weight loss, weight gain, cold intolerance, heat intolerance, diabetes.           MUSCULOSKELETAL:   Positive  for Left ankle pain        DERMATOLOGY:   Negative for rash, bruising.           NEUROLOGY:   Negative for tingling, numbness, gait abnormality, paresthesias, weakness, sciatica.        Examination:  Left {BJJLOCPROXIMITY:29389} {BJJLOCFOOTANKLESTRESSFX:57761}  Edema: Positive.   Ecchymosis/Bruising: Positive.   Percussion Test: Positive {BJJLOCFOOTANKLESTRESSFX:95120}          Tuning Fork Test: Positive, {BJJLOCFOOTANKLESTRESSFX:94370}    Orientation:   Positive  Asymmetrical,because of the swelling.          ROM:   Positive, Decreased Toe flexion and extension due to pain and swelling.            Muscle Strength:   Positive {BJJSTRENGTH:32386} Planar Flexion, Decreased due to pain and swelling  Positive {BJJSTRENGTH:72666}  Dorsi Flexion, Decreased due to pain and swelling        +5/+5 Inversion  +5/+5 Eversion        +5/+5 Plantarflexion in combination with inversion  +5/+5 Plantarflexion in combination with eversion          +5/+5 Dorsiflexion in combination with inversion (Posterior Tibialis)  +5/+5 Dorsiflexion in combination with eversion (Peroneal Brevis)  +5/+5 Dorsiflexion in combination with eversion and flexion of great toe (Peroneal Longus).            Palpation:    Positive, Painful and Tender to Palpation {BJJLOCATION:49779} {BJJLOCPROXIMITY:85901} {BJJLOCFOOTANKLESTRESSFX:40124}           Vascular:   +2/+4 Dorsalis Pedis  +2/+4 Posterior Tibialis  Capillary Refill < 2 Seconds.        Leg/Ankle/Foot - Ankle Impingement:  Posterior Ankle Impingement Test: Negative.   Anterior Ankle Impingement Test: Negative.         Leg/Ankle/Foot - Ankle Instability:  Flexibility Test:  Positive   Anterior Drawer Test:  Positive    Talar Tilt Test:  Positive   External Rotation Kleiger Plantar Flexion Test:  Positive   External Rotation Kleiger Dorsiflexion Test:  Positive   Squeeze Test:  Positive, Distal tibia and fibula.   Dorsiflexion Test:  Positive   Posterior Tibial Instability Test: Negative.  Peroneal Tendon Instability Test:  Positive  Horizontal Squeeze Test:  Positive  Vertical Squeeze Test:  Negative.   Plantarflexion:  Positive  Standing/Walking Test:  Positive, Painful.   Tibial Torsion Test:  Positive       Leg/Ankle/Foot - DVT:  Yary's Sign: Negative.         Ankle/Foot - Forefoot:  Strunsky Test:  Negative.   Gaenslen Maneuver Test:  Negative.   Metatarsal Tap Test:  Positive  {BJJLOCEVERSIONANKLEINJURY:88297}  Crepitation Test:  Negative.         Leg/Ankle/Foot - Hindfoot Achilles/Calcaneus:  Turner Compression Test: Negative.   Hoffa Sign: Negative.   Achilles Tendon Tap Test: Negative.   Heel Compression Test: Positive  .         Leg/Ankle/Foot - Nerve Irritation:  Kan Sign: Negative.   Digital Nerve Stretch Test: Negative.   Tinel Sign: Negative.   Tourniquet Sign: Negative.         Feet/Foot:   Positive BILATERAL  Valgus foot         Imaging and Diagnostics Review:  XR ankle left 3+ views  Interpreted By:  Hamida Alatorre,   STUDY:  XR ANKLE LEFT 3+ VIEWS;  4/16/2025 9:10 am      FINDINGS:  There is no evidence of acute fracture or dislocation.      Ankle mortise is preserved.      No soft tissue swelling is seen.      No evidence for radiopaque foreign body.       Calcaneal spur is noted.      IMPRESSION:  Calcaneal spur.        Signed by: Hamida Alatorre 4/17/2025 1:01 PM  Dictation workstation:   [id number]       Assessment   No diagnosis found.    Treatment or Intervention:  May use PRICE therapy as needed  Patient was given a *** brace for their *** injury/condition. The patient is ambulatory with or without aid and feels more stable with the brace on. The brace definitely helps improve their function as well as stability. Verbal and written instructions for the use, wear schedule, cleaning, and application of this brace were given. Patient was instructed that should the brace result in increased pain, decreased sensation, numbness and/or tingling, increased swelling, or an overall worsening of their medical condition; to please contact our office immediately. Orthotic/brace management and training was provided for skin care, modifications due to healing tissues, edema changes, interruption in skin integrity, and safety precautions with the Orthosis/brace.  Start into Physical Therapy 1-2 times a week for 8-10 weeks with manual therapy as well as dry needling and IASTM  Reviewed home exercises to be performed by the patient routinely  Reviewed ice bucket icing and exercises to be performed while icing a minimum of twice daily   Stressed the importance of wearing shoes with good stability control to help with the biomechanics affecting the lower legs  Stressed the importance of wearing full foot insoles to help with the biomechanics affecting the lower legs  Recommendation over-the-counter calcium with vitamin-D 2 -3000+ milligrams a day, as well as OTC symphytum as directed daily to promote bony healing, in addition to a daily multivitamin.   Recommendation over-the-counter curcumin, turmeric, boswellia, as well as egg shell membrane as directed to aid with joint inflammation.  Recommendation over-the-counter Move Free for joint health.  May take OTC Tylenol Extra  Strength or OTC Tylenol Arthritis, taking one every 6-8 hours with food as needed for pain management  Patient advised regarding the risks and/or potential adverse reactions and/or side effects of any prescribed medications along with any over-the-counter medications or any supplements used. Patient advised to seek immediate medical care if any adverse reactions occur. The patient and/or patient(s) parent(s) verbalized their understanding.  Discussed in detail with the patient to the level of their understanding the possibility in the future of *** ankle to rule out ligament or tendon injury versus stress fracture versus other  Discussed in detail with the patient to the level of their understanding the possibility in the future of regenerative injections versus corticosteroid injections   You have been ordered an MRI of the ***. Once you contact scheduling at [phone] and obtain the date and time of your MRI, contact our office at [phone] to schedule your follow-up appointment to review your results. Please note the results of your imaging will not be discussed over the telephone.   At the patient's next office visit, will provide appropriate *** millimeter heel lift to be placed in the *** shoe to accommodate for leg length discrepancy found on standing erect pelvis xray  Will discuss with the  and/or  as appropriate  Follow-up after *** ankle MRI or in 2-4 weeks for a reevaluation and possible xray or sooner as needed.          SANDRO TRUJILLO on 4/22/25 at 8:29 AM.     Please note that this report has been produced using speech recognition software.  It may contain errors related to grammar, punctuation or spelling.  Electronically signed, but not reviewed.      Chivo Roach D.O. FAOASM         [1] No family history on file.

## 2025-04-29 ENCOUNTER — APPOINTMENT (OUTPATIENT)
Dept: SPORTS MEDICINE | Facility: CLINIC | Age: 70
End: 2025-04-29
Payer: MEDICARE

## 2025-04-30 NOTE — PROGRESS NOTES
"Verbal consent of the patient and/or verbal parental consent for patients under the age of 18 have been obtained to conduct a physical examination at this office visit.    The , REZA BUSTOS was present in the room during the entire visit including, but not limited to the physical examination.    New patient  History Of Present Illness  05/02/25 Douglas Domingo is a 69 y.o. male who presents for an evaluation of their Left ankle and LEFT low back. 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 States that his heel spur started acting up a couple of weeks ago. Notes that he gets them every couple of years. However, notes that this one feels different with a \"pulling\" sensation around his ankle. He went to see his primary care provider who ordered xrays that he completed. He notes that he knows what exercises he should be doing for his heel spurs, however is afraid to do them due to whatever muscle or bone is pulling. Pain primarily in the heel but notes that it is currently \"nonexisting\". Notes that they typically can last from a couple of weeks to months. Pain with walking when he does have issues. Denies any numbness or tingling. Rates current pain as a 0-1/10 and when it does act up 9/10 only when walking. He likes to stay physically active with running, racquetball and pickleball. Notes that he has a night splint that will alleviate his pain when he does wear it. However when the pain acts up he has pain consistently for 24hrs. Typically for running he wears Hoka when he power walks.    All previous Progress Notes and imaging results related to this patients chief complaint have been reviewed in preparation for this examination.    Past Medical History  He has a past medical history of COVID-19 (03/30/2021), Essential (primary) hypertension (08/30/2022), Personal history of other diseases of the circulatory system, Personal history of other " specified conditions (12/16/2021), Personal history of other specified conditions, and Pure hypercholesterolemia, unspecified (08/30/2022).    Surgical History  He has a past surgical history that includes Other surgical history (03/30/2021) and MR angio head wo IV contrast (1/29/2012).     Social History  He reports that he has never smoked. He has never used smokeless tobacco. He reports current alcohol use of about 10.0 standard drinks of alcohol per week. He reports that he does not use drugs.    Family History  Family History[1]     Allergies  Patient has no known allergies.    Historical Clinical Intake    Review of Systems  CONSTITUTIONAL:   Negative for weight change, loss of appetite, fatigue, weakness, fever, chills, night sweats, headaches .           HEENT:   Negative for cold, cough, sore throat, sinus pain, swollen lymph nodes.           OPHTHALMOLOGY:   Negative for diminished vision, blurred vision, loss of vision, double vision.           ALLERGY:   Negative for runny nose, scratchy throat, sinus congestion, rash, facial pressure, nasal congestion, post-nasal drip.           CARDIOLOGY:   Negative for chest pain, palpitations, murmurs, irregular heart beat, shortness of breath, leg edema, dyspnea on exertion, fatigue, dizziness.           RESPIRATORY:   Negative for chest pain, shortness of breath, swelling of the legs, asthma/copd, chest congestion, pain with breathing .           GASTROENTEROLOGY:   Negative for nausea, vomitting, heartburn, constipation, diarrhea, blood in stool, change in bowel habits, black stool.           HEMATOLOGY/LYMPH:   Negative for fatigue, loss of appetitie, easy bruising, easy bleeding, anemia, abnormal bleeding, slow healing.           ENDOCRINOLOGY:   Negative for polyuria, polydipsia, polyphagia, fatigue, weight loss, weight gain, cold intolerance, heat intolerance, diabetes.           MUSCULOSKELETAL:   Positive  for Left ankle pain, lumbar joint pain LEFT worse  than RIGHT        DERMATOLOGY:   Negative for rash, bruising.           NEUROLOGY:   Negative for tingling, numbness, gait abnormality, paresthesias, weakness, sciatica.        Examination:  Left Plantar Calcaneus  Edema: Positive.   Ecchymosis/Bruising: Positive.   Percussion Test: Positive Calcaneus          Tuning Fork Test: Positive, Calcaneus    Orientation:   Positive  Asymmetrical,because of the swelling.          ROM:   Positive FROM painful plantarflexion and dorsiflexion due to pain .            Muscle Strength:   Positive +4/+5 Planar Flexion, Decreased due to pain and swelling  Positive +4/+5  Dorsi Flexion, Decreased due to pain and swelling        +5/+5 Inversion  +5/+5 Eversion        +5/+5 Plantarflexion in combination with inversion  +5/+5 Plantarflexion in combination with eversion          +5/+5 Dorsiflexion in combination with inversion (Posterior Tibialis)  +5/+5 Dorsiflexion in combination with eversion (Peroneal Brevis)  +5/+5 Dorsiflexion in combination with eversion and flexion of great toe (Peroneal Longus).            Palpation:   Positive, Painful and Tender to Palpation Left Plantar Calcaneus anteromedial at the origin of the plantar fascia           Vascular:   +2/+4 Dorsalis Pedis  +2/+4 Posterior Tibialis  Capillary Refill < 2 Seconds.        Leg/Ankle/Foot - Ankle Impingement:  Posterior Ankle Impingement Test: Negative.   Anterior Ankle Impingement Test: Negative.         Leg/Ankle/Foot - Ankle Instability:  Flexibility Test:  Positive   Anterior Drawer Test:  Negative.  Talar Tilt Test: Negative.  External Rotation Kleiger Plantar Flexion Test:  Negative.  External Rotation Kleiger Dorsiflexion Test: Negative.  Squeeze Test:  Positive, calcaneus  Dorsiflexion Test:  Negative.  Posterior Tibial Instability Test: Negative.  Peroneal Tendon Instability Test:  Negative.  Horizontal Squeeze Test:  Positive calcaneus  Vertical Squeeze Test:  Positive   Plantarflexion:   Negative.  Standing/Walking Test:  Positive, Painful.   Tibial Torsion Test:  Positive       Leg/Ankle/Foot - DVT:  Yary's Sign: Negative.         Ankle/Foot - Forefoot:  Strunsky Test:  Negative.   Gaenslen Maneuver Test:  Negative.   Metatarsal Tap Test:  Negative  Crepitation Test:  Negative.         Leg/Ankle/Foot - Hindfoot Achilles/Calcaneus:  Turner Compression Test: Negative.   Hoffa Sign: Negative.   Achilles Tendon Tap Test: Negative.   Heel Compression Test: Positive  .         Leg/Ankle/Foot - Nerve Irritation:  Kan Sign: Negative.   Digital Nerve Stretch Test: Negative.   Tinel Sign: Negative.   Tourniquet Sign: Negative.         Feet/Foot:   Positive BILATERAL  Valgus foot  ------------------------------------------------------  Examination:  Left Lumbar Spine with Radiculitis     Edema: Negative.   TART Findings: Positive  Tissue Texture Changes, Asymmetry, Restriction, Tenderness paraspinal muscles L3-L5 on LEFT lower lumbar spine.   Ecchymosis/Bruising: Negative.   Percussion Test (LUMBAR): Negative.   Tuning Fork Test (LUMBAR): Negative.   Percussion (Sacrum): Negative.   Tuning Fork (Sacrum): Negative.     Orientation:  Orientation (LUMBAR): Positive  Decreased Lumbar Lordosis due to muscle spasms.   Orientation (Sacrum):  Positive  Decreased Sacral Flexion/Extension.     ROM (LUMBAR):   Positive  Decreased due to pain, Forward Flexion, Extension          Muscle Strength:   Positive +4/+5 Hamstring Flexion  Positive +4/+5 Quadricep Extension  Positive +4/+5 Hip Flexion  Positive +4/+5 Hip Extension  +5/+5 Hip ADduction toward body  +5/+5 Hip ABduction away from body  +5/+5 Hip Internal Rotation at 90 Degrees  +5/+5 Hip External Rotation at 90 Degrees  +5/+5 Hip Internal Rotation at 0 Degrees  +5/+5 Hip External Rotation at 0 Degrees.            DTR/Neurological: 2-Point Discrimination:  Negative,Toe Walk normal,Heel Walk normal   +2/+4 Patellar Reflex (L-4)  +2/+4 Posterior Tibialis and  Medial Hamstrings Reflex (L5)  +2/+4 Achilles Reflex (S-1).            Sensation/Neurological Lumbar:   Negative Sensation Intact, 2-Point Discrimination    Negative L1: Low back, hips, and groin  Negative L2: Low back and front of inside of upper leg/thigh  Negative L3: Low back and front of upper leg/thigh  Negative L4: Low back, front of upper leg/thigh, front of lower leg/calf, front of medial area of knee, and inside of ankle  Negative L5: Low back, front and lateral knee, front and outside of lower leg/calf, top and bottom of foot and first four toes especially big toe.            Sensation/Neurological Sacrum:   Negative  Sensation Intact, 2 -Point Discrimination Test:    Negative S1: low back, back of upper leg/thigh, back and inside of lower leg, calf and little toe  Negative S2: Buttocks, genitals, back of upper leg/thigh and calves  Negative S3: Buttocks and genitals  Negative S4: Buttocks  Negative S5: Buttocks.     Sensation/Neurological Coccygeal:   Negative Sensation Intact, 2-Point Discrimination:    Negative Coccyx: Buttocks and area of tailbone.     Palpation: Positive  Tender to Palpation over the Left Lumbar Spine as well as the Paraspinal Musculature- LEFT L3-L5, and left SI joint            Vascular:   Capillary Refill < 2 seconds  +2/+4Carotid  +2/+4 Dorsalis Pedis  +2/+4 Posterior Tibial.                Low Back-Disc Injury:  Valsalva Maneuver: Negative.   Fermoral Nerve Traction Test: Positive   Slump Test: Positive    Cross Test Seated: Positive    Seated Straight Leg Raise: Positive    Laseague Sign: Positive    Laseague Differential Test: Positive    Laseague Drop Test: Positive    Seated Laseague Test: Positive     Reverse Laseague Test: Positive    Tip Toe Heel Walking Test: Negative.   Sagar Prone Knee Flexion Test: Positive      Multifidus Toe Touch Test (MT3): Negative.  Prone Instability Test (PIT): Negative  Multifidus Lift Test (MLT): Negative       Low Back-SI  Joint:  Three-Phase Hyperextension Test: Positive    Yeoman Test: Negative.   Sacroilliac Stress Test: Positive     Abduction Stress Test: Positive            Low Back-Spondy:  Stork Test: Negative.   Sphinx Test: Negative.   Modified Sphinx Test: Negative.            Sacrum:  Standing Flexion Test: Positive  Left  Seated Flexion Test: Positive Left  Spring Test: Negative   Sacral Somatic Dysfunction: Positive RrRa: Right rotation Right axis  Hip Flexor Tightness: Positive LEFT > Right  Hamstring Tightness: Positive LEFT > Right    Lumbar somatic dysfunction Positive  L5 side bent right rotated left as well as L1-L4 side bent left rotated left    Leg Length:  Leg Length Supine: Positive Will verify with standing erect pelvis xray   Leg Length Supine to Seated (Derbolowsky Sign): Positive Will verify with standing erect pelvis xray     Feet/Foot:   Positive  BILATERAL Valgus foot     Imaging and Diagnostics Review:  Right leg shorter than left leg by the following:  Iliac crest:  12.4 mm  Sacral base:  5.6 mm  Midline femoral heads:  12.4 mm  Median difference of the right leg being shorter than left leg is approximately:  10.13 mm     --------------------------------------  XR ankle left 3+ views  Interpreted By:  Hamida Alatorre,   STUDY:  XR ANKLE LEFT 3+ VIEWS;  4/16/2025 9:10 am      FINDINGS:  There is no evidence of acute fracture or dislocation.      Ankle mortise is preserved.      No soft tissue swelling is seen.      No evidence for radiopaque foreign body.      Calcaneal spur is noted.      IMPRESSION:  Calcaneal spur.        Signed by: Hamida Alatorre 4/17/2025 1:01 PM  Dictation workstation:   [id number]       Assessment   1. Acute left ankle pain  Referral to Orthopedics and Sports Medicine    XR lumbar spine complete 4+ views    XR pelvis 1-2 views    Referral to Physical Therapy    predniSONE (Deltasone) 20 mg tablet    ketorolac (Toradol) injection 30 mg    methylPREDNISolone sodium succinate (PF)  (SOLU-Medrol) injection 125 mg    methylPREDNISolone acetate (DEPO-Medrol) injection 40 mg    cyclobenzaprine (Flexeril) 10 mg tablet    MR lumbar spine wo IV contrast      2. Left foot pain  XR lumbar spine complete 4+ views    XR pelvis 1-2 views    Referral to Physical Therapy    predniSONE (Deltasone) 20 mg tablet    ketorolac (Toradol) injection 30 mg    methylPREDNISolone sodium succinate (PF) (SOLU-Medrol) injection 125 mg    methylPREDNISolone acetate (DEPO-Medrol) injection 40 mg    cyclobenzaprine (Flexeril) 10 mg tablet    MR lumbar spine wo IV contrast      3. Calcaneal spur of foot, left  XR lumbar spine complete 4+ views    XR pelvis 1-2 views    Referral to Physical Therapy    predniSONE (Deltasone) 20 mg tablet    ketorolac (Toradol) injection 30 mg    methylPREDNISolone sodium succinate (PF) (SOLU-Medrol) injection 125 mg    methylPREDNISolone acetate (DEPO-Medrol) injection 40 mg    cyclobenzaprine (Flexeril) 10 mg tablet    MR lumbar spine wo IV contrast      4. Calcaneal bursitis (heel), left  XR lumbar spine complete 4+ views    XR pelvis 1-2 views    Referral to Physical Therapy    predniSONE (Deltasone) 20 mg tablet    ketorolac (Toradol) injection 30 mg    methylPREDNISolone sodium succinate (PF) (SOLU-Medrol) injection 125 mg    methylPREDNISolone acetate (DEPO-Medrol) injection 40 mg    cyclobenzaprine (Flexeril) 10 mg tablet    MR lumbar spine wo IV contrast      5. Plantar fasciitis, left  XR lumbar spine complete 4+ views    XR pelvis 1-2 views    Referral to Physical Therapy    predniSONE (Deltasone) 20 mg tablet    ketorolac (Toradol) injection 30 mg    methylPREDNISolone sodium succinate (PF) (SOLU-Medrol) injection 125 mg    methylPREDNISolone acetate (DEPO-Medrol) injection 40 mg    cyclobenzaprine (Flexeril) 10 mg tablet    MR lumbar spine wo IV contrast      6. Hamstring tightness of both lower extremities  XR lumbar spine complete 4+ views    XR pelvis 1-2 views    Referral to  Physical Therapy    predniSONE (Deltasone) 20 mg tablet    ketorolac (Toradol) injection 30 mg    methylPREDNISolone sodium succinate (PF) (SOLU-Medrol) injection 125 mg    methylPREDNISolone acetate (DEPO-Medrol) injection 40 mg    cyclobenzaprine (Flexeril) 10 mg tablet    MR lumbar spine wo IV contrast    LEFT WORSE THAN RIGHT      7. Valgus foot deformity, acquired, unspecified laterality  XR lumbar spine complete 4+ views    XR pelvis 1-2 views    Referral to Physical Therapy    predniSONE (Deltasone) 20 mg tablet    ketorolac (Toradol) injection 30 mg    methylPREDNISolone sodium succinate (PF) (SOLU-Medrol) injection 125 mg    methylPREDNISolone acetate (DEPO-Medrol) injection 40 mg    cyclobenzaprine (Flexeril) 10 mg tablet    MR lumbar spine wo IV contrast      8. Hip flexor tightness, unspecified laterality  XR lumbar spine complete 4+ views    XR pelvis 1-2 views    Referral to Physical Therapy    predniSONE (Deltasone) 20 mg tablet    ketorolac (Toradol) injection 30 mg    methylPREDNISolone sodium succinate (PF) (SOLU-Medrol) injection 125 mg    methylPREDNISolone acetate (DEPO-Medrol) injection 40 mg    cyclobenzaprine (Flexeril) 10 mg tablet    MR lumbar spine wo IV contrast    LEFT WORSE THAN RIGHT      9. Leg length discrepancy  XR lumbar spine complete 4+ views    XR pelvis 1-2 views    Referral to Physical Therapy    predniSONE (Deltasone) 20 mg tablet    ketorolac (Toradol) injection 30 mg    methylPREDNISolone sodium succinate (PF) (SOLU-Medrol) injection 125 mg    methylPREDNISolone acetate (DEPO-Medrol) injection 40 mg    cyclobenzaprine (Flexeril) 10 mg tablet    MR lumbar spine wo IV contrast      10. Left lumbar radiculopathy  XR lumbar spine complete 4+ views    XR pelvis 1-2 views    Referral to Physical Therapy    predniSONE (Deltasone) 20 mg tablet    ketorolac (Toradol) injection 30 mg    methylPREDNISolone sodium succinate (PF) (SOLU-Medrol) injection 125 mg    methylPREDNISolone  acetate (DEPO-Medrol) injection 40 mg    cyclobenzaprine (Flexeril) 10 mg tablet    MR lumbar spine wo IV contrast      11. Lumbosacral strain, initial encounter  XR lumbar spine complete 4+ views    XR pelvis 1-2 views    Referral to Physical Therapy    predniSONE (Deltasone) 20 mg tablet    ketorolac (Toradol) injection 30 mg    methylPREDNISolone sodium succinate (PF) (SOLU-Medrol) injection 125 mg    methylPREDNISolone acetate (DEPO-Medrol) injection 40 mg    cyclobenzaprine (Flexeril) 10 mg tablet    MR lumbar spine wo IV contrast      12. Discitis of lumbar region  XR lumbar spine complete 4+ views    XR pelvis 1-2 views    Referral to Physical Therapy    predniSONE (Deltasone) 20 mg tablet    ketorolac (Toradol) injection 30 mg    methylPREDNISolone sodium succinate (PF) (SOLU-Medrol) injection 125 mg    methylPREDNISolone acetate (DEPO-Medrol) injection 40 mg    cyclobenzaprine (Flexeril) 10 mg tablet    MR lumbar spine wo IV contrast      13. Sacral region somatic dysfunction  XR lumbar spine complete 4+ views    XR pelvis 1-2 views    Referral to Physical Therapy    predniSONE (Deltasone) 20 mg tablet    ketorolac (Toradol) injection 30 mg    methylPREDNISolone sodium succinate (PF) (SOLU-Medrol) injection 125 mg    methylPREDNISolone acetate (DEPO-Medrol) injection 40 mg    cyclobenzaprine (Flexeril) 10 mg tablet    MR lumbar spine wo IV contrast      14. Lumbar region somatic dysfunction  XR lumbar spine complete 4+ views    XR pelvis 1-2 views    Referral to Physical Therapy    predniSONE (Deltasone) 20 mg tablet    ketorolac (Toradol) injection 30 mg    methylPREDNISolone sodium succinate (PF) (SOLU-Medrol) injection 125 mg    methylPREDNISolone acetate (DEPO-Medrol) injection 40 mg    cyclobenzaprine (Flexeril) 10 mg tablet    MR lumbar spine wo IV contrast          Treatment or Intervention:  May continue ice and moist heat therapy as needed  Patient was given a AIRHEEL brace for their LEFT FOOT  injury/condition. The patient is ambulatory with or without aid and feels more stable with the brace on. The brace definitely helps improve their function as well as stability. Verbal and written instructions for the use, wear schedule, cleaning, and application of this brace were given. Patient was instructed that should the brace result in increased pain, decreased sensation, numbness and/or tingling, increased swelling, or an overall worsening of their medical condition; to please contact our office immediately. Orthotic/brace management and training was provided for skin care, modifications due to healing tissues, edema changes, interruption in skin integrity, and safety precautions with the Orthosis/brace.  Start into Physical Therapy 1-2 times a week for 8-10 weeks with manual therapy as well as dry needling and IASTM and lumbar traction as well as dry needling plantar fascia  Reviewed home exercises to be performed by the patient routinely  Stressed the importance of wearing shoes with good stability control to help with the biomechanics affecting the lower legs  Stressed the importance of wearing full foot insoles to help with the biomechanics affecting the lower legs  Recommendation over-the-counter calcium with vitamin-D 2 -3000+ milligrams a day, as well as a daily multivitamin.   Recommendation over-the-counter curcumin, turmeric, boswellia, as well as egg shell membrane as directed to aid with joint inflammation.  Recommendation over-the-counter Move Free for joint health.  May take OTC Tylenol Extra Strength or OTC Tylenol Arthritis, taking one every 6-8 hours with food as needed for pain management  TREATMENT PLAN: Patient received IM injection of SoluMedrol 125 milligrams at the time of this office visit. , Patient received IM injection of Toradol 60 milligrams at the time of this office visit., Patient received IM injection of DepoMedrol 40 milligrams at the time of this office visit., and  The  patient tolerated the injection(s) well and without any adverse effects as observed, verified s/p 15 minutes.    20 DAY TAPER: The following prescription was electronically sent to the patient's pharmacy of record: Prednisone 20mg, take 4 tablets a day x 5 days, 3 tablets a day x 5 days, 2 tablets a day x 5 days, and 1 tablet a day x 5 day take with food; start tomorrow if received Solu-Medrol injection in the office at visit; otherwise, start immediately, dispense quantity sufficient, with no refills    The following prescription was electronically sent to the patient's pharmacy of record: Flexeril (Cyclobenzaprine) 10 milligrams, may take 1 tablet 1-2 hours before bedtime as needed for muscle relaxation, Duration: 30 days, Dispense: #30, Refill: 0   Patient advised regarding the risks and/or potential adverse reactions and/or side effects of any prescribed medications along with any over-the-counter medications or any supplements used. Patient advised to seek immediate medical care if any adverse reactions occur. The patient and/or patient(s) parent(s) verbalized their understanding.  Discussed in detail with the patient to the level of their understanding the possibility in the future of LEFT ankle to rule out ligament or tendon injury versus stress fracture versus other  Discussed in detail with the patient to the level of their understanding the possibility in the future of regenerative injections versus corticosteroid injections   MRI of LUMBAR spine to rule out disc herniation vs fracture vs other. MSK to read   Possibility of MRI of left foot and ankle in the future  At the patient's next office visit, will provide appropriate 9.0 millimeter heel lift to be placed in the 9.0 shoe to accommodate for leg length discrepancy found on standing erect pelvis xray to be placed into the right shoe  Possibility of future recommendation custom orthotics with built in 10.13 mm heel lift in the right shoe  **NO IBUPROFEN  WHILE TAKING PREDNISONE- TYLENOL ONLY  START PREDNISONE TOMORROW  Follow-up after LUMBAR MRI     You have been ordered an MRI of the LUMBAR. Once you contact scheduling at [phone] and obtain the date and time of your MRI, contact our office at [phone] to schedule your follow-up appointment to review your results.       KO ROACH on 5/2/25 at 1:11 PM.     Please note that this report has been produced using speech recognition software.  It may contain errors related to grammar, punctuation or spelling.  Electronically signed, but not reviewed.      Ko Roach D.O. FAOASM         [1] No family history on file.

## 2025-05-02 ENCOUNTER — HOSPITAL ENCOUNTER (OUTPATIENT)
Dept: RADIOLOGY | Facility: CLINIC | Age: 70
Discharge: HOME | End: 2025-05-02
Payer: MEDICARE

## 2025-05-02 ENCOUNTER — OFFICE VISIT (OUTPATIENT)
Dept: SPORTS MEDICINE | Facility: CLINIC | Age: 70
End: 2025-05-02
Payer: MEDICARE

## 2025-05-02 VITALS
BODY MASS INDEX: 26.07 KG/M2 | SYSTOLIC BLOOD PRESSURE: 128 MMHG | HEART RATE: 60 BPM | DIASTOLIC BLOOD PRESSURE: 68 MMHG | WEIGHT: 172 LBS | HEIGHT: 68 IN

## 2025-05-02 DIAGNOSIS — M25.572 ACUTE LEFT ANKLE PAIN: ICD-10-CM

## 2025-05-02 DIAGNOSIS — M72.2 PLANTAR FASCIITIS, LEFT: ICD-10-CM

## 2025-05-02 DIAGNOSIS — M21.70 LEG LENGTH DISCREPANCY: ICD-10-CM

## 2025-05-02 DIAGNOSIS — M77.32 CALCANEAL SPUR OF FOOT, LEFT: ICD-10-CM

## 2025-05-02 DIAGNOSIS — M54.16 LEFT LUMBAR RADICULOPATHY: ICD-10-CM

## 2025-05-02 DIAGNOSIS — M77.52 CALCANEAL BURSITIS (HEEL), LEFT: ICD-10-CM

## 2025-05-02 DIAGNOSIS — M46.46 DISCITIS OF LUMBAR REGION: ICD-10-CM

## 2025-05-02 DIAGNOSIS — M24.559 HIP FLEXOR TIGHTNESS, UNSPECIFIED LATERALITY: ICD-10-CM

## 2025-05-02 DIAGNOSIS — M79.672 LEFT FOOT PAIN: ICD-10-CM

## 2025-05-02 DIAGNOSIS — S39.012A LUMBOSACRAL STRAIN, INITIAL ENCOUNTER: ICD-10-CM

## 2025-05-02 DIAGNOSIS — M99.04 SACRAL REGION SOMATIC DYSFUNCTION: ICD-10-CM

## 2025-05-02 DIAGNOSIS — M62.9 HAMSTRING TIGHTNESS OF BOTH LOWER EXTREMITIES: ICD-10-CM

## 2025-05-02 DIAGNOSIS — M21.079 VALGUS FOOT DEFORMITY, ACQUIRED, UNSPECIFIED LATERALITY: ICD-10-CM

## 2025-05-02 DIAGNOSIS — M99.03 LUMBAR REGION SOMATIC DYSFUNCTION: ICD-10-CM

## 2025-05-02 PROCEDURE — 96372 THER/PROPH/DIAG INJ SC/IM: CPT | Performed by: FAMILY MEDICINE

## 2025-05-02 PROCEDURE — 72170 X-RAY EXAM OF PELVIS: CPT

## 2025-05-02 PROCEDURE — 99214 OFFICE O/P EST MOD 30 MIN: CPT | Performed by: FAMILY MEDICINE

## 2025-05-02 PROCEDURE — 72110 X-RAY EXAM L-2 SPINE 4/>VWS: CPT

## 2025-05-02 PROCEDURE — 2500000004 HC RX 250 GENERAL PHARMACY W/ HCPCS (ALT 636 FOR OP/ED): Performed by: FAMILY MEDICINE

## 2025-05-02 RX ORDER — PREDNISONE 20 MG/1
TABLET ORAL
Qty: 30 TABLET | Refills: 0 | Status: SHIPPED | OUTPATIENT
Start: 2025-05-02 | End: 2025-05-12

## 2025-05-02 RX ORDER — KETOROLAC TROMETHAMINE 30 MG/ML
30 INJECTION, SOLUTION INTRAMUSCULAR; INTRAVENOUS ONCE
Status: COMPLETED | OUTPATIENT
Start: 2025-05-02 | End: 2025-05-02

## 2025-05-02 RX ORDER — METHYLPREDNISOLONE ACETATE 40 MG/ML
40 INJECTION, SUSPENSION INTRA-ARTICULAR; INTRALESIONAL; INTRAMUSCULAR; SOFT TISSUE ONCE
Status: COMPLETED | OUTPATIENT
Start: 2025-05-02 | End: 2025-05-02

## 2025-05-02 RX ORDER — CYCLOBENZAPRINE HCL 10 MG
10 TABLET ORAL DAILY
Qty: 30 TABLET | Refills: 1 | Status: SHIPPED | OUTPATIENT
Start: 2025-05-02

## 2025-05-02 RX ORDER — METHYLPREDNISOLONE SODIUM SUCCINATE 125 MG/2ML
125 INJECTION INTRAMUSCULAR; INTRAVENOUS ONCE
Status: COMPLETED | OUTPATIENT
Start: 2025-05-02 | End: 2025-05-02

## 2025-05-02 RX ADMIN — METHYLPREDNISOLONE ACETATE 40 MG: 40 INJECTION, SUSPENSION INTRA-ARTICULAR; INTRALESIONAL; INTRAMUSCULAR; SOFT TISSUE at 09:03

## 2025-05-02 RX ADMIN — METHYLPREDNISOLONE SODIUM SUCCINATE 125 MG: 125 INJECTION, POWDER, FOR SOLUTION INTRAMUSCULAR; INTRAVENOUS at 09:04

## 2025-05-02 RX ADMIN — KETOROLAC TROMETHAMINE 30 MG: 60 INJECTION, SOLUTION INTRAMUSCULAR at 09:02

## 2025-05-02 ASSESSMENT — PATIENT HEALTH QUESTIONNAIRE - PHQ9
1. LITTLE INTEREST OR PLEASURE IN DOING THINGS: NOT AT ALL
2. FEELING DOWN, DEPRESSED OR HOPELESS: NOT AT ALL
SUM OF ALL RESPONSES TO PHQ9 QUESTIONS 1 AND 2: 0

## 2025-05-02 ASSESSMENT — PAIN SCALES - GENERAL: PAINLEVEL_OUTOF10: 0-NO PAIN

## 2025-05-02 ASSESSMENT — ENCOUNTER SYMPTOMS
DEPRESSION: 0
LOSS OF SENSATION IN FEET: 0
OCCASIONAL FEELINGS OF UNSTEADINESS: 0

## 2025-05-02 NOTE — PATIENT INSTRUCTIONS
May use PRICE therapy as needed  Patient was given a AIRHEEL brace for their LEFT FOOT injury/condition. The patient is ambulatory with or without aid and feels more stable with the brace on. The brace definitely helps improve their function as well as stability. Verbal and written instructions for the use, wear schedule, cleaning, and application of this brace were given. Patient was instructed that should the brace result in increased pain, decreased sensation, numbness and/or tingling, increased swelling, or an overall worsening of their medical condition; to please contact our office immediately. Orthotic/brace management and training was provided for skin care, modifications due to healing tissues, edema changes, interruption in skin integrity, and safety precautions with the Orthosis/brace.  Start into Physical Therapy 1-2 times a week for 8-10 weeks with manual therapy as well as dry needling and IASTM  Reviewed home exercises to be performed by the patient routinely  Stressed the importance of wearing shoes with good stability control to help with the biomechanics affecting the lower legs  Stressed the importance of wearing full foot insoles to help with the biomechanics affecting the lower legs  Recommendation over-the-counter calcium with vitamin-D 2 -3000+ milligrams a day, as well as OTC symphytum as directed daily to promote bony healing, in addition to a daily multivitamin.   Recommendation over-the-counter curcumin, turmeric, boswellia, as well as egg shell membrane as directed to aid with joint inflammation.  Recommendation over-the-counter Move Free for joint health.  May take OTC Tylenol Extra Strength or OTC Tylenol Arthritis, taking one every 6-8 hours with food as needed for pain management  Patient advised regarding the risks and/or potential adverse reactions and/or side effects of any prescribed medications along with any over-the-counter medications or any supplements used. Patient advised  to seek immediate medical care if any adverse reactions occur. The patient and/or patient(s) parent(s) verbalized their understanding.  Discussed in detail with the patient to the level of their understanding the possibility in the future of LEFT ankle to rule out ligament or tendon injury versus stress fracture versus other  Discussed in detail with the patient to the level of their understanding the possibility in the future of regenerative injections versus corticosteroid injections   MRI of LUMBAR spine to rule out disc herniation vs fracture vs other. MSK to read   At the patient's next office visit, will provide appropriate ___ millimeter heel lift to be placed in the ___ shoe to accommodate for leg length discrepancy found on standing erect pelvis xray  **NO IBUPROFEN WHILE TAKING PREDNISONE- TYLENOL ONLY  START PREDNISONE TOMORROW  Follow-up after LUMBAR MRI     You have been ordered an MRI of the LUMBAR. Once you contact scheduling at [phone] and obtain the date and time of your MRI, contact our office at [phone] to schedule your follow-up appointment to review your results.

## 2025-05-14 ENCOUNTER — APPOINTMENT (OUTPATIENT)
Dept: PRIMARY CARE | Facility: CLINIC | Age: 70
End: 2025-05-14
Payer: MEDICARE

## 2025-05-16 ENCOUNTER — HOSPITAL ENCOUNTER (OUTPATIENT)
Dept: RADIOLOGY | Facility: CLINIC | Age: 70
Discharge: HOME | End: 2025-05-16
Payer: MEDICARE

## 2025-05-16 DIAGNOSIS — M21.70 LEG LENGTH DISCREPANCY: ICD-10-CM

## 2025-05-16 DIAGNOSIS — M25.572 ACUTE LEFT ANKLE PAIN: ICD-10-CM

## 2025-05-16 DIAGNOSIS — M62.9 HAMSTRING TIGHTNESS OF BOTH LOWER EXTREMITIES: ICD-10-CM

## 2025-05-16 DIAGNOSIS — M79.672 LEFT FOOT PAIN: ICD-10-CM

## 2025-05-16 DIAGNOSIS — M72.2 PLANTAR FASCIITIS, LEFT: ICD-10-CM

## 2025-05-16 DIAGNOSIS — M46.46 DISCITIS OF LUMBAR REGION: ICD-10-CM

## 2025-05-16 DIAGNOSIS — M77.52 CALCANEAL BURSITIS (HEEL), LEFT: ICD-10-CM

## 2025-05-16 DIAGNOSIS — M21.079 VALGUS FOOT DEFORMITY, ACQUIRED, UNSPECIFIED LATERALITY: ICD-10-CM

## 2025-05-16 DIAGNOSIS — M24.559 HIP FLEXOR TIGHTNESS, UNSPECIFIED LATERALITY: ICD-10-CM

## 2025-05-16 DIAGNOSIS — M99.03 LUMBAR REGION SOMATIC DYSFUNCTION: ICD-10-CM

## 2025-05-16 DIAGNOSIS — S39.012A LUMBOSACRAL STRAIN, INITIAL ENCOUNTER: ICD-10-CM

## 2025-05-16 DIAGNOSIS — M99.04 SACRAL REGION SOMATIC DYSFUNCTION: ICD-10-CM

## 2025-05-16 DIAGNOSIS — M54.16 LEFT LUMBAR RADICULOPATHY: ICD-10-CM

## 2025-05-16 DIAGNOSIS — M77.32 CALCANEAL SPUR OF FOOT, LEFT: ICD-10-CM

## 2025-05-16 PROCEDURE — 72148 MRI LUMBAR SPINE W/O DYE: CPT

## 2025-05-23 NOTE — PROGRESS NOTES
Verbal consent of the patient and/or verbal parental consent for patients under the age of 18 have been obtained to conduct a physical examination at this office visit.    The , Yunier SANCHEZ was present in the room during the entire visit including, but not limited to the physical examination.    Established patient  History Of Present Illness  06/02/25 Douglas Domingo is a 69 y.o. male who presents for a lumbar MRI review. States that he continues to have 4/10 pain in the lower back consistently with no radiculopathy. He is scheduled for Physical therapy June 5th. No new or worsening symtpoms since he was last seen. States that he is taking ibuprofen as needed for pain and that seems to take the edge off when his symptoms worsen. States that he got a lot of relief from the prednisone taper, then his pain quickly returned once finished.  He says he does not have the radicular symptoms that he had previously though just the lower back pain.  His function though he is functioning relatively okay.  We talked in detail about his MRI results.  He is going to be starting physical therapy and will make sure that they are doing lumbar traction.  We talked about potentially if needed in the future epidural injections however he said he is doing well enough that he feels healthy okay down the line that he will not need the injections.  He said additionally the heel area is better where he has a calcaneal spur but it still a little bit sore.  He says he does not have the pain is much in the morning when he gets up but it is there especially after he sitting for long periods of time.  We talked about plantar fasciitis additionally as well in detail.    All previous Progress Notes and imaging results related to this patients chief complaint have been reviewed in preparation for this examination.    Past Medical History  He has a past medical history of COVID-19 (03/30/2021), Essential (primary) hypertension  "(08/30/2022), Personal history of other diseases of the circulatory system, Personal history of other specified conditions (12/16/2021), Personal history of other specified conditions, and Pure hypercholesterolemia, unspecified (08/30/2022).    Surgical History  He has a past surgical history that includes Other surgical history (03/30/2021) and MR angio head wo IV contrast (1/29/2012).     Social History  He reports that he has never smoked. He has never used smokeless tobacco. He reports current alcohol use of about 10.0 standard drinks of alcohol per week. He reports that he does not use drugs.    Family History  Family History[1]     Allergies  Patient has no known allergies.    Historical Clinical Intake  05/02/25 Douglas Domingo is a 69 y.o. male who presents for an evaluation of their Left ankle and LEFT low back. 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 States that his heel spur started acting up a couple of weeks ago. Notes that he gets them every couple of years. However, notes that this one feels different with a \"pulling\" sensation around his ankle. He went to see his primary care provider who ordered xrays that he completed. He notes that he knows what exercises he should be doing for his heel spurs, however is afraid to do them due to whatever muscle or bone is pulling. Pain primarily in the heel but notes that it is currently \"nonexisting\". Notes that they typically can last from a couple of weeks to months. Pain with walking when he does have issues. Denies any numbness or tingling. Rates current pain as a 0-1/10 and when it does act up 9/10 only when walking. He likes to stay physically active with running, racquetball and pickleball. Notes that he has a night splint that will alleviate his pain when he does wear it. However when the pain acts up he has pain consistently for 24hrs. Typically for running he wears Hoka when he power " walks.   Review of Systems  CONSTITUTIONAL:   Negative for weight change, loss of appetite, fatigue, weakness, fever, chills, night sweats, headaches .           HEENT:   Negative for cold, cough, sore throat, sinus pain, swollen lymph nodes.           OPHTHALMOLOGY:   Negative for diminished vision, blurred vision, loss of vision, double vision.           ALLERGY:   Negative for runny nose, scratchy throat, sinus congestion, rash, facial pressure, nasal congestion, post-nasal drip.           CARDIOLOGY:   Negative for chest pain, palpitations, murmurs, irregular heart beat, shortness of breath, leg edema, dyspnea on exertion, fatigue, dizziness.           RESPIRATORY:   Negative for chest pain, shortness of breath, swelling of the legs, asthma/copd, chest congestion, pain with breathing .           GASTROENTEROLOGY:   Negative for nausea, vomitting, heartburn, constipation, diarrhea, blood in stool, change in bowel habits, black stool.           HEMATOLOGY/LYMPH:   Negative for fatigue, loss of appetitie, easy bruising, easy bleeding, anemia, abnormal bleeding, slow healing.           ENDOCRINOLOGY:   Negative for polyuria, polydipsia, polyphagia, fatigue, weight loss, weight gain, cold intolerance, heat intolerance, diabetes.           MUSCULOSKELETAL:   Positive  for Left ankle pain, lumbar joint pain LEFT worse than RIGHT        DERMATOLOGY:   Negative for rash, bruising.           NEUROLOGY:   Negative for tingling, numbness, gait abnormality, paresthesias, weakness, sciatica.        Examination: Improved since previous visit  Left Plantar Calcaneus  Edema: Positive.   Ecchymosis/Bruising: Positive.   Percussion Test: Negative  Tuning Fork Test: Negative    Orientation:   Symmetrical         ROM:   Positive FROM no longer painful with plantarflexion and dorsiflexion due to pain .            Muscle Strength:   Positive +4-+5/+5 Planar Flexion, Decreased due to stiffness   positive +4-+5/+5  Dorsi Flexion,  Decreased due to stiffness        +5/+5 Inversion  +5/+5 Eversion        +5/+5 Plantarflexion in combination with inversion  +5/+5 Plantarflexion in combination with eversion          +5/+5 Dorsiflexion in combination with inversion (Posterior Tibialis)  +5/+5 Dorsiflexion in combination with eversion (Peroneal Brevis)  +5/+5 Dorsiflexion in combination with eversion and flexion of great toe (Peroneal Longus).            Palpation:   Positive, minimal tender to Palpation Left Plantar Calcaneus anteromedial at the origin of the plantar fascia           Vascular:   +2/+4 Dorsalis Pedis  +2/+4 Posterior Tibialis  Capillary Refill < 2 Seconds.        Leg/Ankle/Foot - Ankle Impingement:  Posterior Ankle Impingement Test: Negative.   Anterior Ankle Impingement Test: Negative.         Leg/Ankle/Foot - Ankle Instability: Improved since previous visit  Flexibility Test:  Positive   Anterior Drawer Test:  Negative.  Talar Tilt Test: Negative.  External Rotation Kleiger Plantar Flexion Test:  Negative.  External Rotation Kleiger Dorsiflexion Test: Negative.  Squeeze Test:  Positive, calcaneus  Dorsiflexion Test:  Negative.  Posterior Tibial Instability Test: Negative.  Peroneal Tendon Instability Test:  Negative.  Horizontal Squeeze Test:  Positive calcaneus  Vertical Squeeze Test:  Positive   Plantarflexion:  Negative.  Standing/Walking Test:  Positive, Painful.   Tibial Torsion Test:  Positive       Leg/Ankle/Foot - DVT:  Yary's Sign: Negative.         Ankle/Foot - Forefoot:  Strunsky Test:  Negative.   Gaenslen Maneuver Test:  Negative.   Metatarsal Tap Test:  Negative  Crepitation Test:  Negative.         Leg/Ankle/Foot - Hindfoot Achilles/Calcaneus: Improved since previous visit  Turner Compression Test: Negative.   Hoffa Sign: Negative.   Achilles Tendon Tap Test: Negative.   Heel Compression Test: Positive  .         Leg/Ankle/Foot - Nerve Irritation:  Kan Sign: Negative.   Digital Nerve Stretch Test: Negative.    Tinel Sign: Negative.   Tourniquet Sign: Negative.         Feet/Foot:   Positive BILATERAL  Valgus foot  ------------------------------------------------------  Examination:  Improved since previous visit  Left Lumbar Spine with Radiculitis     Edema: Negative.   TART Findings: Positive  Tissue Texture Changes, Asymmetry, Restriction, Tenderness paraspinal muscles L3-L5 on LEFT lower lumbar spine.   Ecchymosis/Bruising: Negative.   Percussion Test (LUMBAR): Negative.   Tuning Fork Test (LUMBAR): Negative.   Percussion (Sacrum): Negative.   Tuning Fork (Sacrum): Negative.     Orientation:findings improved since last visit  Orientation (LUMBAR): Positive  Decreased still some with lumbar Lordosis due to muscle spasms.   Orientation (Sacrum):  Positive  Decreased Sacral Flexion and Extension.     ROM (LUMBAR):   Positive  Decreased due to stiffness, Forward Flexion and Extension          Muscle Strength: findings improved since last visit  Positive +4/+5 Hamstring Flexion  Positive +4/+5 Quadricep Extension  Positive +4/+5 Hip Flexion  Positive +4/+5 Hip Extension  +5/+5 Hip ADduction toward body  +5/+5 Hip ABduction away from body  +5/+5 Hip Internal Rotation at 90 Degrees  +5/+5 Hip External Rotation at 90 Degrees  +5/+5 Hip Internal Rotation at 0 Degrees  +5/+5 Hip External Rotation at 0 Degrees.            DTR/Neurological: 2-Point Discrimination:  Negative,Toe Walk normal,Heel Walk normal   +2/+4 Patellar Reflex (L-4)  +2/+4 Posterior Tibialis and Medial Hamstrings Reflex (L5)  +2/+4 Achilles Reflex (S-1).            Sensation/Neurological Lumbar:   Negative Sensation Intact, 2-Point Discrimination    Negative L1: Low back, hips, and groin  Negative L2: Low back and front of inside of upper leg/thigh  Negative L3: Low back and front of upper leg/thigh  Negative L4: Low back, front of upper leg/thigh, front of lower leg/calf, front of medial area of knee, and inside of ankle  Negative L5: Low back, front and  lateral knee, front and outside of lower leg/calf, top and bottom of foot and first four toes especially big toe.            Sensation/Neurological Sacrum:   Negative  Sensation Intact, 2 -Point Discrimination Test:    Negative S1: low back, back of upper leg/thigh, back and inside of lower leg, calf and little toe  Negative S2: Buttocks, genitals, back of upper leg/thigh and calves  Negative S3: Buttocks and genitals  Negative S4: Buttocks  Negative S5: Buttocks.     Sensation/Neurological Coccygeal:   Negative Sensation Intact, 2-Point Discrimination:    Negative Coccyx: Buttocks and area of tailbone.     Palpation: Positive  findings improved since last visit   Tender to Palpation over the Left Lumbar Spine as well as the Paraspinal Musculature- LEFT L3-S1           Vascular:   Capillary Refill < 2 seconds  +2/+4Carotid  +2/+4 Dorsalis Pedis  +2/+4 Posterior Tibial.                Low Back-Disc Injury: findings improved since last visit  Valsalva Maneuver: Negative.   Fermoral Nerve Traction Test: Negative   Slump Test: Negative  Cross Test Seated: Positive    Seated Straight Leg Raise: Positive    Laseague Sign: Positive    Laseague Differential Test: Positive    L[place] Drop Test: Positive    Seated Laseague Test: Positive     Reverse Laseague Test: Positive    Tip Toe Heel Walking Test: Negative.   Sagar Prone Knee Flexion Test: Positive      Multifidus Toe Touch Test (MT3): Negative.  Prone Instability Test (PIT): Negative  Multifidus Lift Test (MLT): Negative       Low Back-SI Joint:  Three-Phase Hyperextension Test: Positive    Yeoman Test: Negative.   Sacroilliac Stress Test: Positive     Abduction Stress Test: Positive            Low Back-Spondy:  Stork Test: Negative.   Sphinx Test: Negative.   Modified Sphinx Test: Negative.            Sacrum:  Standing Flexion Test: Positive  Left  Seated Flexion Test: Positive Left  Spring Test: Negative   Sacral Somatic Dysfunction: Positive RrRa: Right rotation  Right axis  Hip Flexor Tightness: Positive LEFT > Right  Hamstring Tightness: Positive LEFT > Right    Lumbar somatic dysfunction Positive  L5 side bent right rotated left as well as L1-L4 side bent left rotated left    Leg Length:  Leg Length Supine: Positive Will verify with standing erect pelvis xray   Leg Length Supine to Seated (Derbolowsky Sign): Positive Will verify with standing erect pelvis xray     Feet/Foot:   Positive  BILATERAL Valgus foot     Imaging and Diagnostics Review:  MRI of the lumbar spine without IV contrast;  5/16/2025 8:18 am      ORDERING CLINICIAN:  KO HAGEN      FINDINGS:  For counting purposes the last lumbarized vertebral body is labeled  L5.      There is grade 1 anterolisthesis of L4 on L5. Alignment is otherwise  maintained.      Vertebral body heights are maintained. Bone marrow signal pattern is  within normal limits.      There is desiccated disc signal at L1-L2 through L5-S1. Moderate disc  height loss at L5-S1.      The conus terminates at L1. Prevertebral soft tissues are not  thickened.      Evaluation by level:      T12-L1: No spinal canal or neural foraminal stenosis.      L1-L2: Right eccentric disc bulge and facet arthrosis. No spinal  canal stenosis. No neural foraminal stenosis.      L2-L3: Right eccentric disc bulge and facet arthrosis. Mild spinal  canal stenosis. Mild neural foraminal stenosis.      L3-L4: Disc bulge with a superimposed left subarticular disc  protrusion and bilateral facet arthrosis. Mild spinal canal stenosis,  narrowing of the left subarticular recess, mild-to-moderate left and  mild right neural foraminal stenosis.      L4-L5: Disc bulge and facet arthrosis. No spinal canal stenosis. Mild  neural foraminal stenosis.      L5-S1: Disc bulge with an annular fissure and superimposed central  disc protrusion. Bilateral facet arthrosis. No spinal canal stenosis.  Moderate to severe right and moderate left neural foraminal stenosis.      IMPRESSION  MRI lumbar spine May 19, 2025:  Multilevel degenerative changes of the lumbar spine L1-S1  mild spinal canal stenosis L2-L3; L3-L4.   Varying degrees of neural foraminal narrowing most pronounced at L5-S1 with moderate to severe right and moderate left neural foraminal stenosis.L2-L3 mild; L3-L4 mild right/moderate left; L4-L5 mild; L5-S1 moderate to severe right, moderate left  Multilevel disc bulge L1-S1  Multilevel disc protrusions L3-L4; L5-S1  Multilevel facet arthropathy L1-S1      I personally reviewed the images/study and I agree with the findings  as stated. This study was interpreted at [place], [place].        Signed by: Radha Harmon 5/19/2025 11:17 AM  Dictation workstation:   [id number]    Right leg shorter than left leg by the following:  Iliac crest:  12.4 mm  Sacral base:  5.6 mm  Midline femoral heads:  12.4 mm  Median difference of the right leg being shorter than left leg is approximately:  10.13 mm     --------------------------------------  XR ankle left 3+ views  Interpreted By:  Hamida Alatorre,   STUDY:  XR ANKLE LEFT 3+ VIEWS;  4/16/2025 9:10 am      FINDINGS:  There is no evidence of acute fracture or dislocation.      Ankle mortise is preserved.      No soft tissue swelling is seen.      No evidence for radiopaque foreign body.      Calcaneal spur is noted.      IMPRESSION:  Calcaneal spur.        Signed by: Hamida Alatorre 4/17/2025 1:01 PM  Dictation workstation:   [id number]       Assessment   1. Lumbosacral radiculopathy due to degenerative joint disease of spine        2. Sacral region somatic dysfunction  Referral to Physical Therapy      3. Lumbar region somatic dysfunction  Referral to Physical Therapy      4. Lumbosacral strain, subsequent encounter  Referral to Physical Therapy      5. Degeneration of intervertebral disc of lumbar region with discogenic back pain  Referral to Physical Therapy      6. Degenerative lumbar spinal  stenosis  Referral to Physical Therapy      7. Bulging lumbar disc  Referral to Physical Therapy      8. Protrusion of lumbar intervertebral disc  Referral to Physical Therapy      9. Lumbar facet arthropathy  Referral to Physical Therapy      10. Plantar fasciitis, left  Referral to Physical Therapy      11. Hamstring tightness of both lower extremities  Referral to Physical Therapy      12. Valgus foot deformity, acquired, unspecified laterality  Referral to Physical Therapy      13. Calcaneal spur of foot, left  Referral to Physical Therapy      14. Calcaneal bursitis (heel), left  Referral to Physical Therapy      15. Left foot pain  Referral to Physical Therapy      16. Acute left ankle pain  Referral to Physical Therapy      17. Bulging of lumbar intervertebral disc        18. Spinal stenosis of lumbar region, unspecified whether neurogenic claudication present        19. Neural foraminal stenosis of lumbar spine        20. Facet arthritis of lumbar region        21. Left lumbar radiculopathy        22. Leg length discrepancy        23. Hip flexor tightness, unspecified laterality              Treatment or Intervention:  May continue ice and moist heat therapy as needed  Start into Physical Therapy 1-2 times a week for 8-10 weeks with manual therapy as well as dry needling and IASTM and lumbar traction as well as dry needling plantar fascia  Reviewed home exercises to be performed by the patient routinely  Stressed the importance of wearing shoes with good stability control to help with the biomechanics affecting the lower legs  Continue wearing full foot insoles to help with the biomechanics affecting the lower legs  Continue vitamin-D 2 -3000+ milligrams a day, as well as a daily multivitamin.   Continue over-the-counter curcumin, turmeric, boswellia, as well as egg shell membrane as directed to aid with joint inflammation.  Continue over-the-counter Move Free for joint health.  May continue to take OTC  Tylenol Extra Strength or OTC Tylenol Arthritis, taking one every 6-8 hours with food as needed for pain management    Patient advised regarding the risks and/or potential adverse reactions and/or side effects of any prescribed medications along with any over-the-counter medications or any supplements used. Patient advised to seek immediate medical care if any adverse reactions occur. The patient and/or patient(s) parent(s) verbalized their understanding.  Discussed in detail with the patient to the level of their understanding the possibility in the future of LEFT ankle to rule out ligament or tendon injury versus stress fracture versus other  Discussed in detail with the patient to the level of their understanding the possibility in the future of regenerative injections versus corticosteroid injections   Reviewed lumbar spine MRI in detail with the patient and/or patients parent/legal guardian to their level of understanding; a copy of these results were provided to the patient and/or patients parent/legal guardian at the time of this office visit.   Possibility of MRI of left foot and ankle in the future  Patient given 9.0 millimeter heel lift to be placed in the 9.0 shoe to accommodate for leg length discrepancy found on standing erect pelvis xray to be placed into the right shoe  Possibility of future recommendation custom orthotics with built in 10.13 mm heel lift in the right shoe  Possibility of future referral to physical medicine and rehab or pain management for epidural injections  Follow-up 6-8 WEEKS or sooner if needed      KO ROACH on 6/2/25 at 7:52 AM.     Please note that this report has been produced using speech recognition software.  It may contain errors related to grammar, punctuation or spelling.  Electronically signed, but not reviewed.      Ko Roach D.O. FAOASM         [1] No family history on file.

## 2025-05-30 ENCOUNTER — OFFICE VISIT (OUTPATIENT)
Dept: ORTHOPEDIC SURGERY | Facility: CLINIC | Age: 70
End: 2025-05-30
Payer: MEDICARE

## 2025-05-30 VITALS
WEIGHT: 172 LBS | SYSTOLIC BLOOD PRESSURE: 128 MMHG | HEART RATE: 72 BPM | DIASTOLIC BLOOD PRESSURE: 68 MMHG | BODY MASS INDEX: 26.07 KG/M2 | HEIGHT: 68 IN

## 2025-05-30 DIAGNOSIS — M51.360 DEGENERATION OF INTERVERTEBRAL DISC OF LUMBAR REGION WITH DISCOGENIC BACK PAIN: ICD-10-CM

## 2025-05-30 DIAGNOSIS — M25.572 ACUTE LEFT ANKLE PAIN: ICD-10-CM

## 2025-05-30 DIAGNOSIS — M51.369 BULGING OF LUMBAR INTERVERTEBRAL DISC: ICD-10-CM

## 2025-05-30 DIAGNOSIS — M47.816 FACET ARTHRITIS OF LUMBAR REGION: ICD-10-CM

## 2025-05-30 DIAGNOSIS — M47.816 LUMBAR FACET ARTHROPATHY: ICD-10-CM

## 2025-05-30 DIAGNOSIS — M99.04 SACRAL REGION SOMATIC DYSFUNCTION: ICD-10-CM

## 2025-05-30 DIAGNOSIS — M77.32 CALCANEAL SPUR OF FOOT, LEFT: ICD-10-CM

## 2025-05-30 DIAGNOSIS — M48.061 NEURAL FORAMINAL STENOSIS OF LUMBAR SPINE: ICD-10-CM

## 2025-05-30 DIAGNOSIS — M48.061 DEGENERATIVE LUMBAR SPINAL STENOSIS: ICD-10-CM

## 2025-05-30 DIAGNOSIS — M99.03 LUMBAR REGION SOMATIC DYSFUNCTION: ICD-10-CM

## 2025-05-30 DIAGNOSIS — S39.012D LUMBOSACRAL STRAIN, SUBSEQUENT ENCOUNTER: ICD-10-CM

## 2025-05-30 DIAGNOSIS — M79.672 LEFT FOOT PAIN: ICD-10-CM

## 2025-05-30 DIAGNOSIS — M24.559 HIP FLEXOR TIGHTNESS, UNSPECIFIED LATERALITY: ICD-10-CM

## 2025-05-30 DIAGNOSIS — M72.2 PLANTAR FASCIITIS, LEFT: ICD-10-CM

## 2025-05-30 DIAGNOSIS — M54.16 LEFT LUMBAR RADICULOPATHY: ICD-10-CM

## 2025-05-30 DIAGNOSIS — M62.9 HAMSTRING TIGHTNESS OF BOTH LOWER EXTREMITIES: ICD-10-CM

## 2025-05-30 DIAGNOSIS — M77.52 CALCANEAL BURSITIS (HEEL), LEFT: ICD-10-CM

## 2025-05-30 DIAGNOSIS — M21.70 LEG LENGTH DISCREPANCY: ICD-10-CM

## 2025-05-30 DIAGNOSIS — M47.27 LUMBOSACRAL RADICULOPATHY DUE TO DEGENERATIVE JOINT DISEASE OF SPINE: Primary | ICD-10-CM

## 2025-05-30 DIAGNOSIS — M51.26 PROTRUSION OF LUMBAR INTERVERTEBRAL DISC: ICD-10-CM

## 2025-05-30 DIAGNOSIS — M21.079 VALGUS FOOT DEFORMITY, ACQUIRED, UNSPECIFIED LATERALITY: ICD-10-CM

## 2025-05-30 DIAGNOSIS — M51.369 BULGING LUMBAR DISC: ICD-10-CM

## 2025-05-30 DIAGNOSIS — M48.061 SPINAL STENOSIS OF LUMBAR REGION, UNSPECIFIED WHETHER NEUROGENIC CLAUDICATION PRESENT: ICD-10-CM

## 2025-05-30 PROCEDURE — 1159F MED LIST DOCD IN RCRD: CPT | Performed by: FAMILY MEDICINE

## 2025-05-30 PROCEDURE — 3008F BODY MASS INDEX DOCD: CPT | Performed by: FAMILY MEDICINE

## 2025-05-30 PROCEDURE — 99214 OFFICE O/P EST MOD 30 MIN: CPT | Performed by: FAMILY MEDICINE

## 2025-05-30 PROCEDURE — 3074F SYST BP LT 130 MM HG: CPT | Performed by: FAMILY MEDICINE

## 2025-05-30 PROCEDURE — 1125F AMNT PAIN NOTED PAIN PRSNT: CPT | Performed by: FAMILY MEDICINE

## 2025-05-30 PROCEDURE — 3078F DIAST BP <80 MM HG: CPT | Performed by: FAMILY MEDICINE

## 2025-05-30 ASSESSMENT — PAIN - FUNCTIONAL ASSESSMENT: PAIN_FUNCTIONAL_ASSESSMENT: 0-10

## 2025-05-30 ASSESSMENT — ENCOUNTER SYMPTOMS
OCCASIONAL FEELINGS OF UNSTEADINESS: 0
LOSS OF SENSATION IN FEET: 0
DEPRESSION: 0

## 2025-05-30 ASSESSMENT — PAIN SCALES - GENERAL
PAINLEVEL_OUTOF10: 4
PAINLEVEL_OUTOF10: 4

## 2025-05-30 ASSESSMENT — COLUMBIA-SUICIDE SEVERITY RATING SCALE - C-SSRS
2. HAVE YOU ACTUALLY HAD ANY THOUGHTS OF KILLING YOURSELF?: NO
6. HAVE YOU EVER DONE ANYTHING, STARTED TO DO ANYTHING, OR PREPARED TO DO ANYTHING TO END YOUR LIFE?: NO
1. IN THE PAST MONTH, HAVE YOU WISHED YOU WERE DEAD OR WISHED YOU COULD GO TO SLEEP AND NOT WAKE UP?: NO

## 2025-05-30 ASSESSMENT — PAIN DESCRIPTION - DESCRIPTORS: DESCRIPTORS: ACHING;SORE

## 2025-05-30 NOTE — PATIENT INSTRUCTIONS
May continue ice and moist heat therapy as needed  Start into Physical Therapy 1-2 times a week for 8-10 weeks with manual therapy as well as dry needling and IASTM and lumbar traction as well as dry needling plantar fascia  Reviewed home exercises to be performed by the patient routinely  Stressed the importance of wearing shoes with good stability control to help with the biomechanics affecting the lower legs  Stressed the importance of wearing full foot insoles to help with the biomechanics affecting the lower legs  Recommendation over-the-counter calcium with vitamin-D 2 -3000+ milligrams a day, as well as a daily multivitamin.   Recommendation over-the-counter curcumin, turmeric, boswellia, as well as egg shell membrane as directed to aid with joint inflammation.  Recommendation over-the-counter Move Free for joint health.  May take OTC Tylenol Extra Strength or OTC Tylenol Arthritis, taking one every 6-8 hours with food as needed for pain management    Patient advised regarding the risks and/or potential adverse reactions and/or side effects of any prescribed medications along with any over-the-counter medications or any supplements used. Patient advised to seek immediate medical care if any adverse reactions occur. The patient and/or patient(s) parent(s) verbalized their understanding.  Discussed in detail with the patient to the level of their understanding the possibility in the future of LEFT ankle to rule out ligament or tendon injury versus stress fracture versus other  Discussed in detail with the patient to the level of their understanding the possibility in the future of regenerative injections versus corticosteroid injections   Reviewed lumbar spine MRI in detail with the patient and/or patients parent/legal guardian to their level of understanding; a copy of these results were provided to the patient and/or patients parent/legal guardian at the time of this office visit.   Possibility of MRI of left  foot and ankle in the future  At the patient's next office visit, will provide appropriate 9.0 millimeter heel lift to be placed in the 9.0 shoe to accommodate for leg length discrepancy found on standing erect pelvis xray to be placed into the right shoe  Possibility of future recommendation custom orthotics with built in 10.13 mm heel lift in the right shoe  Follow-up 6-8 WEEKS

## 2025-06-02 DIAGNOSIS — I10 BENIGN ESSENTIAL HTN: ICD-10-CM

## 2025-06-02 PROBLEM — M47.27 LUMBOSACRAL RADICULOPATHY DUE TO DEGENERATIVE JOINT DISEASE OF SPINE: Status: ACTIVE | Noted: 2025-06-02

## 2025-06-02 PROBLEM — M51.369 BULGING OF LUMBAR INTERVERTEBRAL DISC: Status: ACTIVE | Noted: 2025-05-02

## 2025-06-02 PROBLEM — M48.061 NEURAL FORAMINAL STENOSIS OF LUMBAR SPINE: Status: ACTIVE | Noted: 2025-06-02

## 2025-06-02 PROBLEM — M51.26 PROTRUSION OF LUMBAR INTERVERTEBRAL DISC: Status: ACTIVE | Noted: 2025-06-02

## 2025-06-02 PROBLEM — M48.061 LUMBAR CANAL STENOSIS: Status: ACTIVE | Noted: 2025-06-02

## 2025-06-02 PROBLEM — M47.816 FACET ARTHRITIS OF LUMBAR REGION: Status: ACTIVE | Noted: 2025-06-02

## 2025-06-02 RX ORDER — LOSARTAN POTASSIUM 50 MG/1
50 TABLET ORAL DAILY
Qty: 90 TABLET | Refills: 2 | Status: SHIPPED | OUTPATIENT
Start: 2025-06-02

## 2025-06-05 ENCOUNTER — EVALUATION (OUTPATIENT)
Dept: PHYSICAL THERAPY | Facility: CLINIC | Age: 70
End: 2025-06-05
Payer: MEDICARE

## 2025-06-05 DIAGNOSIS — M99.04 SACRAL REGION SOMATIC DYSFUNCTION: ICD-10-CM

## 2025-06-05 DIAGNOSIS — M62.9 HAMSTRING TIGHTNESS OF BOTH LOWER EXTREMITIES: ICD-10-CM

## 2025-06-05 DIAGNOSIS — M51.360 DEGENERATION OF INTERVERTEBRAL DISC OF LUMBAR REGION WITH DISCOGENIC BACK PAIN: Primary | ICD-10-CM

## 2025-06-05 DIAGNOSIS — M46.46 DISCITIS OF LUMBAR REGION: ICD-10-CM

## 2025-06-05 DIAGNOSIS — M51.26 PROTRUSION OF LUMBAR INTERVERTEBRAL DISC: ICD-10-CM

## 2025-06-05 DIAGNOSIS — M79.672 LEFT FOOT PAIN: ICD-10-CM

## 2025-06-05 DIAGNOSIS — M21.079 VALGUS FOOT DEFORMITY, ACQUIRED, UNSPECIFIED LATERALITY: ICD-10-CM

## 2025-06-05 DIAGNOSIS — S39.012A LUMBOSACRAL STRAIN, INITIAL ENCOUNTER: ICD-10-CM

## 2025-06-05 DIAGNOSIS — S39.012D LUMBOSACRAL STRAIN, SUBSEQUENT ENCOUNTER: ICD-10-CM

## 2025-06-05 DIAGNOSIS — M99.03 LUMBAR REGION SOMATIC DYSFUNCTION: ICD-10-CM

## 2025-06-05 DIAGNOSIS — M47.816 LUMBAR FACET ARTHROPATHY: ICD-10-CM

## 2025-06-05 DIAGNOSIS — M24.559 HIP FLEXOR TIGHTNESS, UNSPECIFIED LATERALITY: ICD-10-CM

## 2025-06-05 DIAGNOSIS — M21.70 LEG LENGTH DISCREPANCY: ICD-10-CM

## 2025-06-05 DIAGNOSIS — M54.16 LEFT LUMBAR RADICULOPATHY: ICD-10-CM

## 2025-06-05 DIAGNOSIS — M48.061 DEGENERATIVE LUMBAR SPINAL STENOSIS: ICD-10-CM

## 2025-06-05 DIAGNOSIS — M77.52 CALCANEAL BURSITIS (HEEL), LEFT: ICD-10-CM

## 2025-06-05 DIAGNOSIS — M51.369 BULGING LUMBAR DISC: ICD-10-CM

## 2025-06-05 DIAGNOSIS — M72.2 PLANTAR FASCIITIS, LEFT: ICD-10-CM

## 2025-06-05 DIAGNOSIS — M25.572 ACUTE LEFT ANKLE PAIN: ICD-10-CM

## 2025-06-05 DIAGNOSIS — M77.32 CALCANEAL SPUR OF FOOT, LEFT: ICD-10-CM

## 2025-06-05 PROCEDURE — 97161 PT EVAL LOW COMPLEX 20 MIN: CPT | Mod: GP

## 2025-06-05 NOTE — PROGRESS NOTES
Physical Therapy Evaluation    Patient Name: Douglas Domingo  MRN: [id number]  Evaluation Date: 6/5/2025  Time Calculation  Start Time: 0748  Stop Time: 0825  Time Calculation (min): 37 min  PT Evaluation Time Entry  PT Evaluation (Low) Time Entry: 33     PT Therapeutic Procedures Time Entry  Therapeutic Exercise Time Entry: 4      Problem List Items Addressed This Visit           ICD-10-CM    Acute left ankle pain M25.572    Left foot pain M79.672    Calcaneal spur of foot, left M77.32    Calcaneal bursitis (heel), left M77.52    Plantar fasciitis, left M72.2    Hamstring tightness of both lower extremities M62.9    Relevant Orders    Follow Up In Physical Therapy    Valgus foot deformity, acquired, unspecified laterality M21.079    Hip flexor tightness M24.559    Leg length discrepancy M21.70    Left lumbar radiculopathy M54.16    Relevant Orders    Follow Up In Physical Therapy    Lumbosacral strain S39.012A    Relevant Orders    Follow Up In Physical Therapy    Follow Up In Physical Therapy    Sacral region somatic dysfunction M99.04    Relevant Orders    Follow Up In Physical Therapy    Lumbar region somatic dysfunction M99.03    Relevant Orders    Follow Up In Physical Therapy    Protrusion of lumbar intervertebral disc M51.26    Relevant Orders    Follow Up In Physical Therapy     Other Visit Diagnoses         Codes      Degeneration of intervertebral disc of lumbar region with discogenic back pain    -  Primary M51.360    Relevant Orders    Follow Up In Physical Therapy      Discitis of lumbar region     M46.46    Relevant Orders    Follow Up In Physical Therapy      Degenerative lumbar spinal stenosis     M48.061    Relevant Orders    Follow Up In Physical Therapy      Bulging lumbar disc     M51.369    Relevant Orders    Follow Up In Physical Therapy      Lumbar facet arthropathy     M47.816    Relevant Orders    Follow Up In Physical Therapy            Subjective   General:  General  Referred By:   Doc    Patient reported hx of injury:   Pt has heel spurs for years.  6 weeks ago pt had insidious onset of LBP. To ER where he was given meds and told to use heat.  Saw Dr Roach for this.  Xrays and MRI done.  Pt had 3 shots to help pain.  Referred to PT.  L heel pain has subsided.  Lift for R shoe (10 mm)    Precautions:   None    Relevant PMH:  OA    Red flags: Do you have any of the following? No  Fever/chills, unexplained weight changes, dizziness/fainting, unexplained change in bowel or bladder functions, unexplained malaise or muscle weakness, night pain/sweats, numbness or tingling    Pain:     Pain description: constant jabbing pain LLS pain which may go to R side.  Intermittent pain in B lateral hips with power walking day after power walking 3 miles. Pt denies LE paresthesias.    Pain at present: 5/10 now seated  Maximum pain: 6/10  Minimum pain: 4/10    Pain improves with: Ibuprofen,   Pain worsens with: day after power walking, hasn't lifted heavy, moving sit to stand    Home Living:       Home type: House-no steps  Occupation: full time job doing   Work tasks: standing, some lifting, repetitive push/ pull  Hobbies/activities: exercising, powerwalking    Prior Function Per Pt/Caregiver Report:   Prior to 6 weeks ago pt was able to perfrom ADLs     Imaging:  MRI Lumbar Spine:  IMPRESSION:  Multilevel degenerative changes of the lumbar spine with mild spinal  canal stenosis. Varying degrees of neural foraminal narrowing most  pronounced at L5-S1 with moderate to severe right and moderate left  neural foraminal stenosis.    Objective   Posture:   Ant head with forward rounded shoulders.  Flattened thoracic and lumbar spine    Range of Motion:     Lumbar ROM Range   Flexion 58 deg   Extension 19 deg   R rotation Min enrique   L rotation Min enrique   R sidebend Min enrique   L sidebend Min enrique     B LE AROM is wfl      Strength:     LE MMT L R   Hip flexion 4/5 4-/5   Hip extension 4-/5 4-/5   Hip  abduction 4-/5 4-/5   Hip adduction 4/5 4/5   Hip external rotation 4-/5 4/5   Knee flexion 5/5 5/5   Knee extension 5/5 5/5   Ankle DF 5/5 5/5      Trunk strength: Fair    Flexibility:   B hamstrings and piriformis are tight.    Palpation:   Tender B SI, L glut and TFL    Joint Assessment:      Special Tests:     Slump Test: negative bilaterally  Straight Leg Raise test: negative bilaterally  Standing flexion test: positive    Transfers:   1 -2 UE assist for sit to stand    Outcome Measures:  Other Measures  Oswestry Disablity Index (RICKY): 7     Assessment       Pt is a 69 y.o. male who presents with impairments of LBP. These impairments have led to functional limitations including tolerance of sit to stand, after power walking and heavy lifting. Pt would benefit from skilled physical therapy intervention to improve above impairments and facilitate return to function.    Complexity of Evaluation: Low    Based on the history including personal factors and/or comorbidities, examination of body systems including body structures and function, activity limitations, and/or participation restrictions, as well as clinical presentation, patient meets criteria for a Low complexity evaluation.    Plan  Treatment/Interventions: Aquatic therapy, Dry needling, Education/ Instruction, Electrical stimulation, Manual therapy, Mechanical traction, Neuromuscular re-education, Therapeutic activities, Therapeutic exercises, Ultrasound  PT Plan: Skilled PT  PT Frequency:  (1-2x/week for 12 visits)  Certification Period Start Date: 06/05/25  Certification Period End Date: 09/03/25  Number of Treatments Authorized: 2025; EVAL ONLY, AUTH REQ. 30.00 COPAY, 100% COVERAGE, JOSÉ ANTONIO VANG  Rehab Potential: Good  Plan of Care Agreement: Patient    Insurance Plan: Payor: [employer]ICARE / Plan: [employer] MEDICARE ADVANTAGE / Product Type: *No Product type* /     Plan for next visit: begin DLS, stretches to improve flexibility and strengthen core and  prox hips.  Pt education as necessary. Manual as indicated.  Modalities including US, ES, DN and pelvic traction may be considered.  Aquatics to be considered.      OP EDUCATION:  Outpatient Education  Individual(s) Educated: Patient  Education Provided: Anatomy, Body Mechanics, Home Exercise Program, Physiology, POC  Risk and Benefits Discussed with Patient/Caregiver/Other: yes  Patient/Caregiver Demonstrated Understanding: yes  Plan of Care Discussed and Agreed Upon: yes  Patient Response to Education: Patient/Caregiver Verbalized Understanding of Information, Patient/Caregiver Performed Return Demonstration of Exercises/Activities, Patient/Caregiver Asked Appropriate Questions    Today's Treatment:  No treatment billed today as pt requires prior authorization  Ab brace seated/  Ab brace with sit to stand    Goals:  STGs to be met in 6 visits:  Pt will be able to move sit to stand without increased LBP.  Pt will demonstrate good body mechanics to allow pt to perform ADLs with less LBP.    LTGs to be met in 12 visits:  Pt will be demonstrate increased flexibility so pt can perform tasks without pain.  Pt will be able to lift heavier weight with less pain   Pt will demonstrate increased strength in LE by > 1/2 MMT grade so pt can power walk without pain day after.  Pt will be independent with HEP to allow pt to resume ADLs at home and work.    Pt's goal: To be able to powerwalk without pain and return to [employer].

## 2025-06-10 ENCOUNTER — TREATMENT (OUTPATIENT)
Dept: PHYSICAL THERAPY | Facility: CLINIC | Age: 70
End: 2025-06-10
Payer: MEDICARE

## 2025-06-10 DIAGNOSIS — M51.369 BULGING LUMBAR DISC: ICD-10-CM

## 2025-06-10 DIAGNOSIS — M54.16 LEFT LUMBAR RADICULOPATHY: ICD-10-CM

## 2025-06-10 DIAGNOSIS — M51.360 DEGENERATION OF INTERVERTEBRAL DISC OF LUMBAR REGION WITH DISCOGENIC BACK PAIN: ICD-10-CM

## 2025-06-10 DIAGNOSIS — S39.012A LUMBOSACRAL STRAIN, INITIAL ENCOUNTER: ICD-10-CM

## 2025-06-10 DIAGNOSIS — M51.26 PROTRUSION OF LUMBAR INTERVERTEBRAL DISC: ICD-10-CM

## 2025-06-10 DIAGNOSIS — M48.061 DEGENERATIVE LUMBAR SPINAL STENOSIS: ICD-10-CM

## 2025-06-10 DIAGNOSIS — M46.46 DISCITIS OF LUMBAR REGION: ICD-10-CM

## 2025-06-10 DIAGNOSIS — S39.012D LUMBOSACRAL STRAIN, SUBSEQUENT ENCOUNTER: ICD-10-CM

## 2025-06-10 DIAGNOSIS — M62.9 HAMSTRING TIGHTNESS OF BOTH LOWER EXTREMITIES: ICD-10-CM

## 2025-06-10 DIAGNOSIS — M47.816 LUMBAR FACET ARTHROPATHY: ICD-10-CM

## 2025-06-10 DIAGNOSIS — M99.04 SACRAL REGION SOMATIC DYSFUNCTION: ICD-10-CM

## 2025-06-10 DIAGNOSIS — M99.03 LUMBAR REGION SOMATIC DYSFUNCTION: ICD-10-CM

## 2025-06-10 PROCEDURE — 97110 THERAPEUTIC EXERCISES: CPT | Mod: GP

## 2025-06-10 PROCEDURE — 97035 APP MDLTY 1+ULTRASOUND EA 15: CPT | Mod: GP

## 2025-06-10 NOTE — PROGRESS NOTES
"    Physical Therapy Treatment    Patient Name: Douglas Domingo  MRN: [id number]  Encounter date:  6/10/2025  Time Calculation  Start Time: 0906  Stop Time: 0947  Time Calculation (min): 41 min  PT Modalities Time Entry  Ultrasound Time Entry: 9  PT Therapeutic Procedures Time Entry  Therapeutic Exercise Time Entry: 31       Visit Number:  2 (including evaluation)  Planned total visits: eval+5 approved  Visits Authorized/Insurance Coverage:  EVAL ONLY-Auth Rcvd 5 visits 6/9-8/7 CPTS [id number]  [id number]     Current Problem  Problem List Items Addressed This Visit           ICD-10-CM    Hamstring tightness of both lower extremities M62.9    Left lumbar radiculopathy M54.16    Lumbosacral strain S39.012A    Sacral region somatic dysfunction M99.04    Lumbar region somatic dysfunction M99.03    Protrusion of lumbar intervertebral disc M51.26     Other Visit Diagnoses         Codes      Discitis of lumbar region     M46.46      Degeneration of intervertebral disc of lumbar region with discogenic back pain     M51.360      Degenerative lumbar spinal stenosis     M48.061      Bulging lumbar disc     M51.369      Lumbar facet arthropathy     M47.816              Precautions   none    Pain   6/10    Subjective  General   Pt has constant achy throbbing pain in LLS area.  Pt denies LE pain or paresthesias.  No change since eval.  Pt has pain in lateral hips after he power walks or after walking > 10 min       Objective  Positive standing flexion test on R  Tender L SI    Treatment:  THERAPEUTIC EX:  SKTC - painful on L so ex stopped after 2 reps, ok on L min pain with return to start so pt ab braces and this reduces pain   Ab brace HL 2x10 with pt education on rationale for this ex  Supine hamstring stretch 20\"x3 R/L  Piriformis stretch- no pull felt per pt  Ab brace + hip add 5\"x10  Ab brace + march 10x  Pelvic tilt 10x2  DKTC with GSB  Pt educated extensively on rationale for exs and lumbar anatomy " using model and pictures  .  ULTRASOUND applied to Lumbar psp and prox LS    Parameters:  1 MHz  Percentage:  50%  Patient Position:  R sidelying  Intensity:  1.5 Oliveira/cm2  Time:  9 min        Current HEP:  HL ab brace    Has patient been compliant with HEP? na    Activity tolerance:  fair    OP EDUCATION:   Rationale for exs, education on anatomy    Assessment:     Pt's response to treatment:  Pt had less soreness post US, pt unsure if symptom change with ex  Areas of improvements:  less soreness  Limitations/deficits:  pain    Pain end of session: 5    Plan:     Continue with current POC with the following changes assess roseanne    Assessment of current progress against goals:  Insufficient treatment time to assess progress    Goals:  STGs to be met in 6 visits:  Pt will be able to move sit to stand without increased LBP.  Pt will demonstrate good body mechanics to allow pt to perform ADLs with less LBP.     LTGs to be met in 12 visits:  Pt will be demonstrate increased flexibility so pt can perform tasks without pain.  Pt will be able to lift heavier weight with less pain   Pt will demonstrate increased strength in LE by > 1/2 MMT grade so pt can power walk without pain day after.  Pt will be independent with HEP to allow pt to resume ADLs at home and work.     Pt's goal: To be able to powerwalk without pain and return to [employer].

## 2025-07-02 DIAGNOSIS — E78.00 HYPERCHOLESTEREMIA: ICD-10-CM

## 2025-07-02 RX ORDER — ROSUVASTATIN CALCIUM 40 MG/1
40 TABLET, COATED ORAL DAILY
Qty: 90 TABLET | Refills: 3 | Status: SHIPPED | OUTPATIENT
Start: 2025-07-02 | End: 2026-07-02

## 2025-07-03 DIAGNOSIS — E78.00 HYPERCHOLESTEREMIA: ICD-10-CM

## 2025-07-03 DIAGNOSIS — I10 BENIGN ESSENTIAL HTN: ICD-10-CM

## 2025-07-03 RX ORDER — ROSUVASTATIN CALCIUM 40 MG/1
40 TABLET, COATED ORAL DAILY
Qty: 90 TABLET | Refills: 3 | OUTPATIENT
Start: 2025-07-03

## 2025-07-09 ENCOUNTER — TREATMENT (OUTPATIENT)
Dept: PHYSICAL THERAPY | Facility: CLINIC | Age: 70
End: 2025-07-09
Payer: MEDICARE

## 2025-07-09 DIAGNOSIS — M51.360 DEGENERATION OF INTERVERTEBRAL DISC OF LUMBAR REGION WITH DISCOGENIC BACK PAIN: ICD-10-CM

## 2025-07-09 DIAGNOSIS — M48.061 DEGENERATIVE LUMBAR SPINAL STENOSIS: ICD-10-CM

## 2025-07-09 DIAGNOSIS — M51.26 PROTRUSION OF LUMBAR INTERVERTEBRAL DISC: ICD-10-CM

## 2025-07-09 DIAGNOSIS — M99.04 SACRAL REGION SOMATIC DYSFUNCTION: ICD-10-CM

## 2025-07-09 DIAGNOSIS — M54.16 LEFT LUMBAR RADICULOPATHY: ICD-10-CM

## 2025-07-09 DIAGNOSIS — M99.03 LUMBAR REGION SOMATIC DYSFUNCTION: ICD-10-CM

## 2025-07-09 DIAGNOSIS — S39.012A LUMBOSACRAL STRAIN, INITIAL ENCOUNTER: ICD-10-CM

## 2025-07-09 DIAGNOSIS — S39.012D LUMBOSACRAL STRAIN, SUBSEQUENT ENCOUNTER: ICD-10-CM

## 2025-07-09 DIAGNOSIS — M47.816 LUMBAR FACET ARTHROPATHY: ICD-10-CM

## 2025-07-09 DIAGNOSIS — M51.369 BULGING LUMBAR DISC: ICD-10-CM

## 2025-07-09 DIAGNOSIS — M62.9 HAMSTRING TIGHTNESS OF BOTH LOWER EXTREMITIES: ICD-10-CM

## 2025-07-09 DIAGNOSIS — M46.46 DISCITIS OF LUMBAR REGION: ICD-10-CM

## 2025-07-09 PROCEDURE — 97110 THERAPEUTIC EXERCISES: CPT | Mod: GP

## 2025-07-09 PROCEDURE — 97140 MANUAL THERAPY 1/> REGIONS: CPT | Mod: GP

## 2025-07-16 RX ORDER — METOPROLOL SUCCINATE 25 MG/1
25 TABLET, EXTENDED RELEASE ORAL DAILY
Qty: 90 TABLET | Refills: 3 | Status: SHIPPED | OUTPATIENT
Start: 2025-07-16

## 2025-07-17 ENCOUNTER — APPOINTMENT (OUTPATIENT)
Dept: PHYSICAL THERAPY | Facility: CLINIC | Age: 70
End: 2025-07-17
Payer: MEDICARE

## 2025-07-23 NOTE — PROGRESS NOTES
Verbal consent of the patient and/or verbal parental consent for patients under the age of 18 have been obtained to conduct a physical examination at this office visit.    The , Yunier SANCHEZ was present in the room during the entire visit including, but not limited to the physical examination.    Established patient  History Of Present Illness  07/28/25 Douglas Domingo is a 69 y.o. male who presents for follow up evaluation of his lumbar. States that he is having 7/10 pain today mostly on the left side. States that he was doing pretty good until last week where he was power walking up to 3 miles at a time 2 times weekly, then all of the sudden he felt his back flare up again. States that his pian is not radiating into the leg today. States that since then he has been resting as much as possible and taking ibuprofen for pain management. He went to 3/5 physical therapy appointments with [facility].   He says this time he did not get any radicular symptoms like he did previously.  He says has been feeling slightly better but it is still causing him some issues.    All previous Progress Notes and imaging results related to this patients chief complaint have been reviewed in preparation for this examination.    Past Medical History  He has a past medical history of COVID-19 (03/30/2021), Essential (primary) hypertension (08/30/2022), Personal history of other diseases of the circulatory system, Personal history of other specified conditions (12/16/2021), Personal history of other specified conditions, and Pure hypercholesterolemia, unspecified (08/30/2022).    Surgical History  He has a past surgical history that includes Other surgical history (03/30/2021) and MR angio head wo IV contrast (1/29/2012).     Social History  He reports that he has never smoked. He has never used smokeless tobacco. He reports current alcohol use of about 10.0 standard drinks of alcohol per week. He reports that he does not use  "drugs.    Family History  Family History[1]     Allergies  Patient has no known allergies.    Historical Clinical Intake  05/02/25 Douglas Domingo is a 69 y.o. male who presents for an evaluation of their Left ankle and LEFT low back. 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 States that his heel spur started acting up a couple of weeks ago. Notes that he gets them every couple of years. However, notes that this one feels different with a \"pulling\" sensation around his ankle. He went to see his primary care provider who ordered xrays that he completed. He notes that he knows what exercises he should be doing for his heel spurs, however is afraid to do them due to whatever muscle or bone is pulling. Pain primarily in the heel but notes that it is currently \"nonexisting\". Notes that they typically can last from a couple of weeks to months. Pain with walking when he does have issues. Denies any numbness or tingling. Rates current pain as a 0-1/10 and when it does act up 9/10 only when walking. He likes to stay physically active with running, racquetball and pickleball. Notes that he has a night splint that will alleviate his pain when he does wear it. However when the pain acts up he has pain consistently for 24hrs. Typically for running he wears Hoka when he power walks.   ---------------------------------------------------  06/02/25 Douglas Domingo is a 69 y.o. male who presents for a lumbar MRI review. States that he continues to have 4/10 pain in the lower back consistently with no radiculopathy. He is scheduled for Physical therapy June 5th. No new or worsening symtpoms since he was last seen. States that he is taking ibuprofen as needed for pain and that seems to take the edge off when his symptoms worsen. States that he got a lot of relief from the prednisone taper, then his pain quickly returned once finished.  He says he does not have the " radicular symptoms that he had previously though just the lower back pain.  His function though he is functioning relatively okay.  We talked in detail about his MRI results.  He is going to be starting physical therapy and will make sure that they are doing lumbar traction.  We talked about potentially if needed in the future epidural injections however he said he is doing well enough that he feels healthy okay down the line that he will not need the injections.  He said additionally the heel area is better where he has a calcaneal spur but it still a little bit sore.  He says he does not have the pain is much in the morning when he gets up but it is there especially after he sitting for long periods of time.  We talked about plantar fasciitis additionally as well in detail.     Review of Systems  CONSTITUTIONAL:   Negative for weight change, loss of appetite, fatigue, weakness, fever, chills, night sweats, headaches .           HEENT:   Negative for cold, cough, sore throat, sinus pain, swollen lymph nodes.           OPHTHALMOLOGY:   Negative for diminished vision, blurred vision, loss of vision, double vision.           ALLERGY:   Negative for runny nose, scratchy throat, sinus congestion, rash, facial pressure, nasal congestion, post-nasal drip.           CARDIOLOGY:   Negative for chest pain, palpitations, murmurs, irregular heart beat, shortness of breath, leg edema, dyspnea on exertion, fatigue, dizziness.           RESPIRATORY:   Negative for chest pain, shortness of breath, swelling of the legs, asthma/copd, chest congestion, pain with breathing .           GASTROENTEROLOGY:   Negative for nausea, vomitting, heartburn, constipation, diarrhea, blood in stool, change in bowel habits, black stool.           HEMATOLOGY/LYMPH:   Negative for fatigue, loss of appetitie, easy bruising, easy bleeding, anemia, abnormal bleeding, slow healing.           ENDOCRINOLOGY:   Negative for polyuria, polydipsia, polyphagia,  fatigue, weight loss, weight gain, cold intolerance, heat intolerance, diabetes.           MUSCULOSKELETAL:   Positive  for Left ankle pain, lumbar joint pain LEFT worse than RIGHT        DERMATOLOGY:   Negative for rash, bruising.           NEUROLOGY:   Negative for tingling, numbness, gait abnormality, paresthesias, weakness, sciatica.        Examination: Improved since previous visit  Left Plantar Calcaneus  Edema: Negative  Ecchymosis/Bruising: Negative  Percussion Test: Negative  Tuning Fork Test: Negative    Orientation:   Symmetrical         ROM:   Negative FROM no longer painful with plantarflexion and dorsiflexion due to pain .            Muscle Strength:   +5/+5 Planar Flexion,   +5/+5  Dorsi Flexion,      +5/+5 Inversion  +5/+5 Eversion        +5/+5 Plantarflexion in combination with inversion  +5/+5 Plantarflexion in combination with eversion          +5/+5 Dorsiflexion in combination with inversion (Posterior Tibialis)  +5/+5 Dorsiflexion in combination with eversion (Peroneal Brevis)  +5/+5 Dorsiflexion in combination with eversion and flexion of great toe (Peroneal Longus).            Palpation:   Negative tender to Palpation Left Plantar Calcaneus anteromedial at the origin of the plantar fascia           Vascular:   +2/+4 Dorsalis Pedis  +2/+4 Posterior Tibialis  Capillary Refill < 2 Seconds.        Leg/Ankle/Foot - Ankle Impingement:  Posterior Ankle Impingement Test: Negative.   Anterior Ankle Impingement Test: Negative.         Leg/Ankle/Foot - Ankle Instability: Improved since previous visit  Flexibility Test:  Negative  Anterior Drawer Test:  Negative.  Talar Tilt Test: Negative.  External Rotation Kleiger Plantar Flexion Test:  Negative.  External Rotation Kleiger Dorsiflexion Test: Negative.  Squeeze Test:  Negative  Dorsiflexion Test:  Negative.  Posterior Tibial Instability Test: Negative.  Peroneal Tendon Instability Test:  Negative.  Horizontal Squeeze Test:  Negative  Vertical Squeeze  Test:  Negative  Plantarflexion:  Negative.  Standing/Walking Test: Negative   Tibial Torsion Test:  Negative      Leg/Ankle/Foot - DVT:  Yary's Sign: Negative.         Ankle/Foot - Forefoot:  Strunsky Test:  Negative.   Gaenslen Maneuver Test:  Negative.   Metatarsal Tap Test:  Negative  Crepitation Test:  Negative.         Leg/Ankle/Foot - Hindfoot Achilles/Calcaneus: Improved since previous visit  Turner Compression Test: Negative.   Hoffa Sign: Negative.   Achilles Tendon Tap Test: Negative.   Heel Compression Test: Negative         Leg/Ankle/Foot - Nerve Irritation:  Kan Sign: Negative.   Digital Nerve Stretch Test: Negative.   Tinel Sign: Negative.   Tourniquet Sign: Negative.         Feet/Foot:   Positive BILATERAL  Valgus foot  ------------------------------------------------------  Examination:   Left Lumbar Spine with Radiculitis     Edema: Negative.   TART Findings: Positive  Tissue Texture Changes, Asymmetry, Restriction, Tenderness paraspinal muscles L3-L5 on LEFT lower lumbar spine.   Ecchymosis/Bruising: Negative.   Percussion Test (LUMBAR): Negative.   Tuning Fork Test (LUMBAR): Negative.   Percussion (Sacrum): Negative.   Tuning Fork (Sacrum): Negative.     Orientation:  Orientation (LUMBAR): Positive  Decreased  lumbar Lordosis due to muscle spasms.   Orientation (Sacrum):  Positive  Decreased Sacral Flexion and Extension.     ROM (LUMBAR):   Positive  Decreased due to stiffness, Forward Flexion and Extension          Muscle Strength:   +5/+5 Hamstring Flexion  +5/+5 Quadricep Extension  +5/+5 Hip Flexion  +5/+5 Hip Extension  +5/+5 Hip ADduction toward body  +5/+5 Hip ABduction away from body  +5/+5 Hip Internal Rotation at 90 Degrees  +5/+5 Hip External Rotation at 90 Degrees  +5/+5 Hip Internal Rotation at 0 Degrees  +5/+5 Hip External Rotation at 0 Degrees.            DTR/Neurological: 2-Point Discrimination:  Negative,Toe Walk normal,Heel Walk normal   +2/+4 Patellar Reflex (L-4)  +2/+4  Posterior Tibialis and Medial Hamstrings Reflex (L5)  +2/+4 Achilles Reflex (S-1).            Sensation/Neurological Lumbar:   Negative Sensation Intact, 2-Point Discrimination    Negative L1: Low back, hips, and groin  Negative L2: Low back and front of inside of upper leg/thigh  Negative L3: Low back and front of upper leg/thigh  Negative L4: Low back, front of upper leg/thigh, front of lower leg/calf, front of medial area of knee, and inside of ankle  Negative L5: Low back, front and lateral knee, front and outside of lower leg/calf, top and bottom of foot and first four toes especially big toe.            Sensation/Neurological Sacrum:   Negative  Sensation Intact, 2 -Point Discrimination Test:    Negative S1: low back, back of upper leg/thigh, back and inside of lower leg, calf and little toe  Negative S2: Buttocks, genitals, back of upper leg/thigh and calves  Negative S3: Buttocks and genitals  Negative S4: Buttocks  Negative S5: Buttocks.     Sensation/Neurological Coccygeal:   Negative Sensation Intact, 2-Point Discrimination:    Negative Coccyx: Buttocks and area of tailbone.     Palpation: Positive Tender to Palpation over the Left Lumbar Spine as well as the Paraspinal Musculature- LEFT L3-S1           Vascular:   Capillary Refill < 2 seconds  +2/+4Carotid  +2/+4 Dorsalis Pedis  +2/+4 Posterior Tibial.                Low Back-Disc Injury:   Valsalva Maneuver: Negative.   Fermoral Nerve Traction Test: Positive    Slump Test: Positive    Cross Test Seated: Positive    Seated Straight Leg Raise: Positive    Laseague Sign: Positive    L[place]ague Differential Test: Positive    [place] Drop Test: Positive    Seated L[place]ague Test: Positive     Reverse Laseague Test: Positive    Tip Toe Heel Walking Test: Negative.   Sagar Prone Knee Flexion Test: Positive      Multifidus Toe Touch Test (MT3): Negative.  Prone Instability Test (PIT): Negative  Multifidus Lift Test (MLT): Negative  Laying Straight Leg Raise:     Positive  anterior quadriceps pain    Low Back-SI Joint:  Three-Phase Hyperextension Test: Positive    Yeoman Test: Negative.   Sacroilliac Stress Test: Positive     Abduction Stress Test: Positive            Low Back-Spondy:  Stork Test: Negative.   Sphinx Test: Negative.   Modified Sphinx Test: Negative.            Sacrum:  Standing Flexion Test: Positive  Left  Seated Flexion Test: Positive Left  Spring Test: Negative   Sacral Somatic Dysfunction: Positive RrRa: Right rotation Right axis  Hip Flexor Tightness: Positive LEFT > Right  Hamstring Tightness: Positive LEFT > Right    Lumbar somatic dysfunction Positive  L5 side bent right rotated left as well as L1-L4 side bent left rotated left    Leg Length:  Leg Length Supine: Positive Will verify with standing erect pelvis xray   Leg Length Supine to Seated (Derbolowsky Sign): Positive Will verify with standing erect pelvis xray     Feet/Foot:   Positive  BILATERAL Valgus foot     Imaging and Diagnostics Review:  MRI of the lumbar spine without IV contrast;  5/16/2025 8:18 am      ORDERING CLINICIAN:  KO HAGEN      FINDINGS:  For counting purposes the last lumbarized vertebral body is labeled  L5.      There is grade 1 anterolisthesis of L4 on L5. Alignment is otherwise  maintained.      Vertebral body heights are maintained. Bone marrow signal pattern is  within normal limits.      There is desiccated disc signal at L1-L2 through L5-S1. Moderate disc  height loss at L5-S1.      The conus terminates at L1. Prevertebral soft tissues are not  thickened.      Evaluation by level:      T12-L1: No spinal canal or neural foraminal stenosis.      L1-L2: Right eccentric disc bulge and facet arthrosis. No spinal  canal stenosis. No neural foraminal stenosis.      L2-L3: Right eccentric disc bulge and facet arthrosis. Mild spinal  canal stenosis. Mild neural foraminal stenosis.      L3-L4: Disc bulge with a superimposed left subarticular disc  protrusion and bilateral  facet arthrosis. Mild spinal canal stenosis,  narrowing of the left subarticular recess, mild-to-moderate left and  mild right neural foraminal stenosis.      L4-L5: Disc bulge and facet arthrosis. No spinal canal stenosis. Mild  neural foraminal stenosis.      L5-S1: Disc bulge with an annular fissure and superimposed central  disc protrusion. Bilateral facet arthrosis. No spinal canal stenosis.  Moderate to severe right and moderate left neural foraminal stenosis.      IMPRESSION MRI lumbar spine May 19, 2025:  Multilevel degenerative changes of the lumbar spine L1-S1  mild spinal canal stenosis L2-L3; L3-L4.   Varying degrees of neural foraminal narrowing most pronounced at L5-S1 with moderate to severe right and moderate left neural foraminal stenosis.L2-L3 mild; L3-L4 mild right/moderate left; L4-L5 mild; L5-S1 moderate to severe right, moderate left  Multilevel disc bulge L1-S1  Multilevel disc protrusions L3-L4; L5-S1  Multilevel facet arthropathy L1-S1      I personally reviewed the images/study and I agree with the findings  as stated. This study was interpreted at [place], [place].        Signed by: Radha Harmon 5/19/2025 11:17 AM  Dictation workstation:   [id number]    Right leg shorter than left leg by the following:  Iliac crest:  12.4 mm  Sacral base:  5.6 mm  Midline femoral heads:  12.4 mm  Median difference of the right leg being shorter than left leg is approximately:  10.13 mm     --------------------------------------  XR ankle left 3+ views  Interpreted By:  Hamida Alatorre,   STUDY:  XR ANKLE LEFT 3+ VIEWS;  4/16/2025 9:10 am      FINDINGS:  There is no evidence of acute fracture or dislocation.      Ankle mortise is preserved.      No soft tissue swelling is seen.      No evidence for radiopaque foreign body.      Calcaneal spur is noted.      IMPRESSION:  Calcaneal spur.        Signed by: Hamida Alatorre 4/17/2025 1:01 PM  Dictation workstation:    [id number]       Assessment   1. Sacral region somatic dysfunction        2. Lumbar region somatic dysfunction        3. Lumbosacral strain, subsequent encounter        4. Degeneration of intervertebral disc of lumbar region with discogenic back pain        5. Degenerative lumbar spinal stenosis        6. Bulging lumbar disc        7. Protrusion of lumbar intervertebral disc        8. Lumbar facet arthropathy              Treatment or Intervention:  May continue ice and moist heat therapy as needed  Start back into Physical Therapy 1-2 times a week for 8-10 weeks with manual therapy as well as dry needling and IASTM and lumbar traction: sarah ZAIDI  Reviewed home exercises to be performed by the patient routinely  Stressed the importance of wearing shoes with good stability control to help with the biomechanics affecting the lower legs  Continue wearing full foot insoles to help with the biomechanics affecting the lower legs  Continue vitamin-D 2 -3000+ milligrams a day, as well as a daily multivitamin.   Continue over-the-counter curcumin, turmeric, boswellia, as well as egg shell membrane as directed to aid with joint inflammation.  Continue over-the-counter Move Free for joint health.  May continue to take OTC Tylenol Extra Strength or OTC Tylenol Arthritis, taking one every 6-8 hours with food as needed for pain management    The following prescription was electronically sent to the patient's pharmacy of record: Ibuprofen 800mg, may take 1 tablet every 8 hours with food as needed, Duration: 30 days, Dispense: #90, Refill: 0, the patient may alternate with over-the-counter Tylenol Extra Strength, taking one tablet every 6-8 hours with food as needed.  Patient advised regarding the risks and/or potential adverse reactions and/or side effects of any prescribed medications along with any over-the-counter medications or any supplements used. Patient advised to seek immediate medical care if any adverse reactions  occur. The patient and/or patient(s) parent(s) verbalized their understanding.  Discussed in detail with the patient to the level of their understanding the possibility in the future of LEFT ankle to rule out ligament or tendon injury versus stress fracture versus other  Possibility of MRI of left foot and ankle in the future  Continue 9.0 millimeter heel lift to be placed in the 9.0 shoe to accommodate for leg length discrepancy found on standing erect pelvis xray to be placed into the right shoe  Possibility of future recommendation custom orthotics with built in 10.13 mm heel lift in the right shoe  Possibility of future referral to physical medicine and rehab or pain management for epidural injections  Possibility of new MRI in the future if needed  Follow-up in approximately 3 months or sooner if needed      SANDRO TRUJILLO on 7/28/25 at 8:21 AM.     Please note that this report has been produced using speech recognition software.  It may contain errors related to grammar, punctuation or spelling.  Electronically signed, but not reviewed.      Chivo Roach D.O. FAOASM         [1] No family history on file.

## 2025-07-24 ENCOUNTER — APPOINTMENT (OUTPATIENT)
Dept: PHYSICAL THERAPY | Facility: CLINIC | Age: 70
End: 2025-07-24
Payer: MEDICARE

## 2025-07-28 ENCOUNTER — OFFICE VISIT (OUTPATIENT)
Dept: ORTHOPEDIC SURGERY | Facility: CLINIC | Age: 70
End: 2025-07-28
Payer: MEDICARE

## 2025-07-28 VITALS
HEIGHT: 68 IN | HEART RATE: 68 BPM | SYSTOLIC BLOOD PRESSURE: 126 MMHG | WEIGHT: 172 LBS | DIASTOLIC BLOOD PRESSURE: 72 MMHG | BODY MASS INDEX: 26.07 KG/M2

## 2025-07-28 DIAGNOSIS — M99.03 LUMBAR REGION SOMATIC DYSFUNCTION: ICD-10-CM

## 2025-07-28 DIAGNOSIS — M51.26 PROTRUSION OF LUMBAR INTERVERTEBRAL DISC: ICD-10-CM

## 2025-07-28 DIAGNOSIS — M51.369 BULGING LUMBAR DISC: ICD-10-CM

## 2025-07-28 DIAGNOSIS — M99.04 SACRAL REGION SOMATIC DYSFUNCTION: ICD-10-CM

## 2025-07-28 DIAGNOSIS — M47.816 LUMBAR FACET ARTHROPATHY: ICD-10-CM

## 2025-07-28 DIAGNOSIS — S39.012D LUMBOSACRAL STRAIN, SUBSEQUENT ENCOUNTER: ICD-10-CM

## 2025-07-28 DIAGNOSIS — M51.360 DEGENERATION OF INTERVERTEBRAL DISC OF LUMBAR REGION WITH DISCOGENIC BACK PAIN: ICD-10-CM

## 2025-07-28 DIAGNOSIS — M48.061 DEGENERATIVE LUMBAR SPINAL STENOSIS: ICD-10-CM

## 2025-07-28 PROCEDURE — 99214 OFFICE O/P EST MOD 30 MIN: CPT | Performed by: FAMILY MEDICINE

## 2025-07-28 PROCEDURE — 1125F AMNT PAIN NOTED PAIN PRSNT: CPT | Performed by: FAMILY MEDICINE

## 2025-07-28 PROCEDURE — 1159F MED LIST DOCD IN RCRD: CPT | Performed by: FAMILY MEDICINE

## 2025-07-28 PROCEDURE — 99213 OFFICE O/P EST LOW 20 MIN: CPT | Performed by: FAMILY MEDICINE

## 2025-07-28 PROCEDURE — 3008F BODY MASS INDEX DOCD: CPT | Performed by: FAMILY MEDICINE

## 2025-07-28 PROCEDURE — 3078F DIAST BP <80 MM HG: CPT | Performed by: FAMILY MEDICINE

## 2025-07-28 PROCEDURE — 3074F SYST BP LT 130 MM HG: CPT | Performed by: FAMILY MEDICINE

## 2025-07-28 PROCEDURE — 1036F TOBACCO NON-USER: CPT | Performed by: FAMILY MEDICINE

## 2025-07-28 RX ORDER — IBUPROFEN 800 MG/1
800 TABLET, FILM COATED ORAL EVERY 8 HOURS PRN
Qty: 90 TABLET | Refills: 2 | Status: SHIPPED | OUTPATIENT
Start: 2025-07-28 | End: 2025-10-26

## 2025-07-28 ASSESSMENT — PAIN - FUNCTIONAL ASSESSMENT: PAIN_FUNCTIONAL_ASSESSMENT: 0-10

## 2025-07-28 ASSESSMENT — ENCOUNTER SYMPTOMS
DEPRESSION: 0
LOSS OF SENSATION IN FEET: 0
OCCASIONAL FEELINGS OF UNSTEADINESS: 0

## 2025-07-28 ASSESSMENT — PAIN SCALES - GENERAL
PAINLEVEL_OUTOF10: 7
PAINLEVEL_OUTOF10: 7

## 2025-07-28 NOTE — PATIENT INSTRUCTIONS
May continue ice and moist heat therapy as needed  Start back into Physical Therapy 1-2 times a week for 8-10 weeks with manual therapy as well as dry needling and IASTM and lumbar traction: sarah ZAIDI  Reviewed home exercises to be performed by the patient routinely  Stressed the importance of wearing shoes with good stability control to help with the biomechanics affecting the lower legs  Continue wearing full foot insoles to help with the biomechanics affecting the lower legs  Continue vitamin-D 2 -3000+ milligrams a day, as well as a daily multivitamin.   Continue over-the-counter curcumin, turmeric, boswellia, as well as egg shell membrane as directed to aid with joint inflammation.  Continue over-the-counter Move Free for joint health.  May continue to take OTC Tylenol Extra Strength or OTC Tylenol Arthritis, taking one every 6-8 hours with food as needed for pain management    The following prescription was electronically sent to the patient's pharmacy of record: Ibuprofen 800mg, may take 1 tablet every 8 hours with food as needed, Duration: 30 days, Dispense: #90, Refill: 0, the patient may alternate with over-the-counter Tylenol Extra Strength, taking one tablet every 6-8 hours with food as needed.  Patient advised regarding the risks and/or potential adverse reactions and/or side effects of any prescribed medications along with any over-the-counter medications or any supplements used. Patient advised to seek immediate medical care if any adverse reactions occur. The patient and/or patient(s) parent(s) verbalized their understanding.  Discussed in detail with the patient to the level of their understanding the possibility in the future of LEFT ankle to rule out ligament or tendon injury versus stress fracture versus other  Possibility of MRI of left foot and ankle in the future  Patient given 9.0 millimeter heel lift to be placed in the 9.0 shoe to accommodate for leg length discrepancy found on standing  erect pelvis xray to be placed into the right shoe  Possibility of future recommendation custom orthotics with built in 10.13 mm heel lift in the right shoe  Possibility of future referral to physical medicine and rehab or pain management for epidural injections

## 2025-08-04 DIAGNOSIS — M25.572 ACUTE LEFT ANKLE PAIN: ICD-10-CM

## 2025-08-04 DIAGNOSIS — M54.16 LEFT LUMBAR RADICULOPATHY: ICD-10-CM

## 2025-08-04 DIAGNOSIS — M62.9 HAMSTRING TIGHTNESS OF BOTH LOWER EXTREMITIES: ICD-10-CM

## 2025-08-04 DIAGNOSIS — M99.03 LUMBAR REGION SOMATIC DYSFUNCTION: ICD-10-CM

## 2025-08-04 DIAGNOSIS — M21.70 LEG LENGTH DISCREPANCY: ICD-10-CM

## 2025-08-04 DIAGNOSIS — M72.2 PLANTAR FASCIITIS, LEFT: ICD-10-CM

## 2025-08-04 DIAGNOSIS — M24.559 HIP FLEXOR TIGHTNESS, UNSPECIFIED LATERALITY: ICD-10-CM

## 2025-08-04 DIAGNOSIS — M99.04 SACRAL REGION SOMATIC DYSFUNCTION: ICD-10-CM

## 2025-08-04 DIAGNOSIS — M79.672 LEFT FOOT PAIN: ICD-10-CM

## 2025-08-04 DIAGNOSIS — M77.32 CALCANEAL SPUR OF FOOT, LEFT: ICD-10-CM

## 2025-08-04 DIAGNOSIS — M77.52 CALCANEAL BURSITIS (HEEL), LEFT: ICD-10-CM

## 2025-08-04 DIAGNOSIS — M46.46 DISCITIS OF LUMBAR REGION: ICD-10-CM

## 2025-08-04 DIAGNOSIS — S39.012A LUMBOSACRAL STRAIN, INITIAL ENCOUNTER: ICD-10-CM

## 2025-08-04 DIAGNOSIS — M21.079 VALGUS FOOT DEFORMITY, ACQUIRED, UNSPECIFIED LATERALITY: ICD-10-CM

## 2025-08-05 ENCOUNTER — TELEPHONE (OUTPATIENT)
Dept: ORTHOPEDIC SURGERY | Facility: CLINIC | Age: 70
End: 2025-08-05
Payer: MEDICARE

## 2025-08-05 NOTE — PROGRESS NOTES
Verbal consent of the patient and/or verbal parental consent for patients under the age of 18 have been obtained to conduct a physical examination at this office visit.    The , Yunier SANCHEZ was present in the room during the entire visit including, but not limited to the physical examination.    Established patient  History Of Present Illness  08/06/25 Douglas Domingo is a 69 y.o. male who presents for follow up evaluation of his lumbar. States that around the time he was last seen he was starting to have some increasing symptoms but at that time not enough to warrant any new medications.  However over this past weekend things got significantly worse not only with the pain in his back but radicular symptoms into his left hip. Today he reports with a 9/10 pain and radiating pains into the left hip and quadriceps. States that he has been taking the ibuprofen 800mg 3x daily as well as alternating with Tylenol but it does not seem to help.. States that he did not do anything abnormal to cause this flare up.  He is hoping to get some injections as well as to be put on steroid and a referral over to Dr. Bunn for epidural injections.    All previous Progress Notes and imaging results related to this patients chief complaint have been reviewed in preparation for this examination.    Past Medical History  He has a past medical history of COVID-19 (03/30/2021), Essential (primary) hypertension (08/30/2022), Personal history of other diseases of the circulatory system, Personal history of other specified conditions (12/16/2021), Personal history of other specified conditions, and Pure hypercholesterolemia, unspecified (08/30/2022).    Surgical History  He has a past surgical history that includes Other surgical history (03/30/2021) and MR angio head wo IV contrast (1/29/2012).     Social History  He reports that he has never smoked. He has never used smokeless tobacco. He reports current alcohol use of about 10.0  "standard drinks of alcohol per week. He reports that he does not use drugs.    Family History  Family History[1]     Allergies  Patient has no known allergies.    Historical Clinical Intake  05/02/25 Douglas Domingo is a 69 y.o. male who presents for an evaluation of their Left ankle and LEFT low back. 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 States that his heel spur started acting up a couple of weeks ago. Notes that he gets them every couple of years. However, notes that this one feels different with a \"pulling\" sensation around his ankle. He went to see his primary care provider who ordered xrays that he completed. He notes that he knows what exercises he should be doing for his heel spurs, however is afraid to do them due to whatever muscle or bone is pulling. Pain primarily in the heel but notes that it is currently \"nonexisting\". Notes that they typically can last from a couple of weeks to months. Pain with walking when he does have issues. Denies any numbness or tingling. Rates current pain as a 0-1/10 and when it does act up 9/10 only when walking. He likes to stay physically active with running, racquetball and pickleball. Notes that he has a night splint that will alleviate his pain when he does wear it. However when the pain acts up he has pain consistently for 24hrs. Typically for running he wears Hoka when he power walks.   ---------------------------------------------------  06/02/25 Douglas Domingo is a 69 y.o. male who presents for a lumbar MRI review. States that he continues to have 4/10 pain in the lower back consistently with no radiculopathy. He is scheduled for Physical therapy June 5th. No new or worsening symtpoms since he was last seen. States that he is taking ibuprofen as needed for pain and that seems to take the edge off when his symptoms worsen. States that he got a lot of relief from the prednisone taper, then his pain " quickly returned once finished.  He says he does not have the radicular symptoms that he had previously though just the lower back pain.  His function though he is functioning relatively okay.  We talked in detail about his MRI results.  He is going to be starting physical therapy and will make sure that they are doing lumbar traction.  We talked about potentially if needed in the future epidural injections however he said he is doing well enough that he feels healthy okay down the line that he will not need the injections.  He said additionally the heel area is better where he has a calcaneal spur but it still a little bit sore.  He says he does not have the pain is much in the morning when he gets up but it is there especially after he sitting for long periods of time.  We talked about plantar fasciitis additionally as well in detail.   -------------------------------  07/28/25 Douglas Domingo is a 69 y.o. male who presents for follow up evaluation of his lumbar. States that he is having 7/10 pain today mostly on the left side. States that he was doing pretty good until last week where he was power walking up to 3 miles at a time 2 times weekly, then all of the sudden he felt his back flare up again. States that his pian is not radiating into the leg today. States that since then he has been resting as much as possible and taking ibuprofen for pain management. He went to 3/5 physical therapy appointments with [facility].   He says this time he did not get any radicular symptoms like he did previously.  He says has been feeling slightly better but it is still causing him some issues.     Review of Systems  CONSTITUTIONAL:   Negative for weight change, loss of appetite, fatigue, weakness, fever, chills, night sweats, headaches .           HEENT:   Negative for cold, cough, sore throat, sinus pain, swollen lymph nodes.           OPHTHALMOLOGY:   Negative for diminished vision, blurred vision, loss of vision, double vision.            ALLERGY:   Negative for runny nose, scratchy throat, sinus congestion, rash, facial pressure, nasal congestion, post-nasal drip.           CARDIOLOGY:   Negative for chest pain, palpitations, murmurs, irregular heart beat, shortness of breath, leg edema, dyspnea on exertion, fatigue, dizziness.           RESPIRATORY:   Negative for chest pain, shortness of breath, swelling of the legs, asthma/copd, chest congestion, pain with breathing .           GASTROENTEROLOGY:   Negative for nausea, vomitting, heartburn, constipation, diarrhea, blood in stool, change in bowel habits, black stool.           HEMATOLOGY/LYMPH:   Negative for fatigue, loss of appetitie, easy bruising, easy bleeding, anemia, abnormal bleeding, slow healing.           ENDOCRINOLOGY:   Negative for polyuria, polydipsia, polyphagia, fatigue, weight loss, weight gain, cold intolerance, heat intolerance, diabetes.           MUSCULOSKELETAL:   Positive  for Left ankle pain, lumbar joint pain LEFT worse than RIGHT        DERMATOLOGY:   Negative for rash, bruising.           NEUROLOGY:   Negative for tingling, numbness, gait abnormality, paresthesias, weakness, sciatica.        Examination: Improved since previous visit  Left Plantar Calcaneus  Edema: Negative  Ecchymosis/Bruising: Negative  Percussion Test: Negative  Tuning Fork Test: Negative    Orientation:   Symmetrical         ROM:   Negative FROM no longer painful with plantarflexion and dorsiflexion due to pain .            Muscle Strength:   +5/+5 Planar Flexion,   +5/+5  Dorsi Flexion,      +5/+5 Inversion  +5/+5 Eversion        +5/+5 Plantarflexion in combination with inversion  +5/+5 Plantarflexion in combination with eversion          +5/+5 Dorsiflexion in combination with inversion (Posterior Tibialis)  +5/+5 Dorsiflexion in combination with eversion (Peroneal Brevis)  +5/+5 Dorsiflexion in combination with eversion and flexion of great toe (Peroneal Longus).            Palpation:    Negative tender to Palpation Left Plantar Calcaneus anteromedial at the origin of the plantar fascia           Vascular:   +2/+4 Dorsalis Pedis  +2/+4 Posterior Tibialis  Capillary Refill < 2 Seconds.        Leg/Ankle/Foot - Ankle Impingement:  Posterior Ankle Impingement Test: Negative.   Anterior Ankle Impingement Test: Negative.         Leg/Ankle/Foot - Ankle Instability: Improved since previous visit  Flexibility Test:  Negative  Anterior Drawer Test:  Negative.  Talar Tilt Test: Negative.  External Rotation Kleiger Plantar Flexion Test:  Negative.  External Rotation Kleiger Dorsiflexion Test: Negative.  Squeeze Test:  Negative  Dorsiflexion Test:  Negative.  Posterior Tibial Instability Test: Negative.  Peroneal Tendon Instability Test:  Negative.  Horizontal Squeeze Test:  Negative  Vertical Squeeze Test:  Negative  Plantarflexion:  Negative.  Standing/Walking Test: Negative   Tibial Torsion Test:  Negative      Leg/Ankle/Foot - DVT:  Yary's Sign: Negative.         Ankle/Foot - Forefoot:  Strunsky Test:  Negative.   Gaenslen Maneuver Test:  Negative.   Metatarsal Tap Test:  Negative  Crepitation Test:  Negative.         Leg/Ankle/Foot - Hindfoot Achilles/Calcaneus: Improved since previous visit  Turner Compression Test: Negative.   Hoffa Sign: Negative.   Achilles Tendon Tap Test: Negative.   Heel Compression Test: Negative         Leg/Ankle/Foot - Nerve Irritation:  Kan Sign: Negative.   Digital Nerve Stretch Test: Negative.   Tinel Sign: Negative.   Tourniquet Sign: Negative.         Feet/Foot:   Positive BILATERAL  Valgus foot  ------------------------------------------------------  Examination: Recently reaggravated and flared back up  Left Lumbar Spine with Radiculitis     Edema: Negative.   TART Findings: Positive  Tissue Texture Changes, Asymmetry, Restriction, Tenderness paraspinal muscles L3-L5 on LEFT lower lumbar spine.   Ecchymosis/Bruising: Negative.   Percussion Test (LUMBAR): Negative.    Tuning Fork Test (LUMBAR): Negative.   Percussion (Sacrum): Negative.   Tuning Fork (Sacrum): Negative.     Orientation:  Orientation (LUMBAR):Recently reaggravated and flared back up   Positive  Decreased  lumbar Lordosis due to muscle spasms.     Orientation (Sacrum):  Positive  Decreased Sacral Flexion and Extension.     ROM (LUMBAR): Recently reaggravated and flared back up  Positive  Decreased due to stiffness, Forward Flexion and Extension          Muscle Strength:   +5/+5 Hamstring Flexion  +5/+5 Quadricep Extension  +5/+5 Hip Flexion  +5/+5 Hip Extension  +5/+5 Hip ADduction toward body  +5/+5 Hip ABduction away from body  +5/+5 Hip Internal Rotation at 90 Degrees  +5/+5 Hip External Rotation at 90 Degrees  +5/+5 Hip Internal Rotation at 0 Degrees  +5/+5 Hip External Rotation at 0 Degrees.            DTR/Neurological: 2-Point Discrimination:  Negative,Toe Walk normal,Heel Walk normal   +2/+4 Patellar Reflex (L-4)  +2/+4 Posterior Tibialis and Medial Hamstrings Reflex (L5)  +2/+4 Achilles Reflex (S-1).            Sensation/Neurological Lumbar: Recently reaggravated and flared back up  Positive  Sensation decreased left  Positive  L1: Low back, hips, and groin  Positive  L2: Low back and front of inside of upper leg/thigh  Negative L3: Low back and front of upper leg/thigh  Negative L4: Low back, front of upper leg/thigh, front of lower leg/calf, front of medial area of knee, and inside of ankle  Negative L5: Low back, front and lateral knee, front and outside of lower leg/calf, top and bottom of foot and first four toes especially big toe.            Sensation/Neurological Sacrum:   Negative  Sensation Intact, 2 -Point Discrimination Test:    Negative S1: low back, back of upper leg/thigh, back and inside of lower leg, calf and little toe  Negative S2: Buttocks, genitals, back of upper leg/thigh and calves  Negative S3: Buttocks and genitals  Negative S4: Buttocks  Negative S5: Buttocks.      Sensation/Neurological Coccygeal:   Negative Sensation Intact, 2-Point Discrimination:    Negative Coccyx: Buttocks and area of tailbone.     Palpation: Recently reaggravated and flared back up  Positive Tender to Palpation over the Left Lumbar Spine as well as the Paraspinal Musculature- LEFT L3-S1           Vascular:   Capillary Refill < 2 seconds  +2/+4Carotid  +2/+4 Dorsalis Pedis  +2/+4 Posterior Tibial.                Low Back-Disc Injury: Recently reaggravated and flared back up  Valsalva Maneuver: Negative.   Fermoral Nerve Traction Test: Positive    Slump Test: Positive    Cross Test Seated: Positive    Seated Straight Leg Raise: Positive    Laseague Sign: Positive    Laseague Differential Test: Positive    [place] Drop Test: Positive    Seated Laseague Test: Positive     Reverse Laseague Test: Positive    Tip Toe Heel Walking Test: Negative.   Sagar Prone Knee Flexion Test: Positive      Multifidus Toe Touch Test (MT3): Negative.  Prone Instability Test (PIT): Negative  Multifidus Lift Test (MLT): Negative  Laying Straight Leg Raise:    Positive  anterior quadriceps pain    Low Back-SI Joint:  Three-Phase Hyperextension Test: Positive    Yeoman Test: Negative.   Sacroilliac Stress Test: Positive     Abduction Stress Test: Positive            Low Back-Spondy:  Stork Test: Negative.   Sphinx Test: Negative.   Modified Sphinx Test: Negative.            Sacrum:Recently reaggravated and flared back up  Standing Flexion Test: Positive  Left  Seated Flexion Test: Positive Left  Spring Test: Negative   Sacral Somatic Dysfunction: Positive RrRa: Right rotation Right axis  Hip Flexor Tightness: Positive LEFT > Right  Hamstring Tightness: Positive LEFT > Right    Lumbar somatic dysfunction Positive  L5 side bent right rotated left as well as L1-L4 side bent left rotated left    Leg Length:  Leg Length Supine: Positive right leg shorter than left clinically verified with standing erect pelvis x-ray   leg Length  Supine to Seated (Derbolowsky Sign): Positive right leg shorter than left clinically verified with standing erect pelvis x-ray      Feet/Foot:   Positive  BILATERAL Valgus foot     Imaging and Diagnostics Review:  MRI of the lumbar spine without IV contrast;  5/16/2025 8:18 am      ORDERING CLINICIAN:  KO HAGEN      FINDINGS:  For counting purposes the last lumbarized vertebral body is labeled  L5.      There is grade 1 anterolisthesis of L4 on L5. Alignment is otherwise  maintained.      Vertebral body heights are maintained. Bone marrow signal pattern is  within normal limits.      There is desiccated disc signal at L1-L2 through L5-S1. Moderate disc  height loss at L5-S1.      The conus terminates at L1. Prevertebral soft tissues are not  thickened.      Evaluation by level:      T12-L1: No spinal canal or neural foraminal stenosis.      L1-L2: Right eccentric disc bulge and facet arthrosis. No spinal  canal stenosis. No neural foraminal stenosis.      L2-L3: Right eccentric disc bulge and facet arthrosis. Mild spinal  canal stenosis. Mild neural foraminal stenosis.      L3-L4: Disc bulge with a superimposed left subarticular disc  protrusion and bilateral facet arthrosis. Mild spinal canal stenosis,  narrowing of the left subarticular recess, mild-to-moderate left and  mild right neural foraminal stenosis.      L4-L5: Disc bulge and facet arthrosis. No spinal canal stenosis. Mild  neural foraminal stenosis.      L5-S1: Disc bulge with an annular fissure and superimposed central  disc protrusion. Bilateral facet arthrosis. No spinal canal stenosis.  Moderate to severe right and moderate left neural foraminal stenosis.      IMPRESSION MRI lumbar spine May 19, 2025:  Multilevel degenerative changes of the lumbar spine L1-S1  mild spinal canal stenosis L2-L3; L3-L4.   Varying degrees of neural foraminal narrowing most pronounced at L5-S1 with moderate to severe right and moderate left neural foraminal  stenosis.L2-L3 mild; L3-L4 mild right/moderate left; L4-L5 mild; L5-S1 moderate to severe right, moderate left  Multilevel disc bulge L1-S1  Multilevel disc protrusions L3-L4; L5-S1  Multilevel facet arthropathy L1-S1      I personally reviewed the images/study and I agree with the findings  as stated. This study was interpreted at [facility], [place], [place].        Signed by: Radha Harmon 5/19/2025 11:17 AM  Dictation workstation:   [id number]    Right leg shorter than left leg by the following:  Iliac crest:  12.4 mm  Sacral base:  5.6 mm  Midline femoral heads:  12.4 mm  Median difference of the right leg being shorter than left leg is approximately:  10.13 mm     --------------------------------------  XR ankle left 3+ views  Interpreted By:  Hamida Alatorre,   STUDY:  XR ANKLE LEFT 3+ VIEWS;  4/16/2025 9:10 am      FINDINGS:  There is no evidence of acute fracture or dislocation.      Ankle mortise is preserved.      No soft tissue swelling is seen.      No evidence for radiopaque foreign body.      Calcaneal spur is noted.      IMPRESSION:  Calcaneal spur.        Signed by: Hamida Alatorre 4/17/2025 1:01 PM  Dictation workstation:   [id number]       Assessment   1. Leg length discrepancy        2. Lumbar region somatic dysfunction  Referral to Physical Medicine Rehab    ketorolac (Toradol) injection 30 mg    methylPREDNISolone acetate (DEPO-Medrol) injection 40 mg    methylPREDNISolone sodium succinate (PF) (SOLU-Medrol) injection 125 mg    predniSONE (Deltasone) 10 mg tablet    cyclobenzaprine (Flexeril) 10 mg tablet      3. Lumbosacral strain, subsequent encounter  Referral to Physical Medicine Rehab    ketorolac (Toradol) injection 30 mg    methylPREDNISolone acetate (DEPO-Medrol) injection 40 mg    methylPREDNISolone sodium succinate (PF) (SOLU-Medrol) injection 125 mg    predniSONE (Deltasone) 10 mg tablet    cyclobenzaprine (Flexeril) 10 mg tablet      4. Degeneration  of intervertebral disc of lumbar region with discogenic back pain  Referral to Physical Medicine Rehab    ketorolac (Toradol) injection 30 mg    methylPREDNISolone acetate (DEPO-Medrol) injection 40 mg    methylPREDNISolone sodium succinate (PF) (SOLU-Medrol) injection 125 mg    predniSONE (Deltasone) 10 mg tablet    cyclobenzaprine (Flexeril) 10 mg tablet      5. Degenerative lumbar spinal stenosis  Referral to Physical Medicine Rehab    ketorolac (Toradol) injection 30 mg    methylPREDNISolone acetate (DEPO-Medrol) injection 40 mg    methylPREDNISolone sodium succinate (PF) (SOLU-Medrol) injection 125 mg    predniSONE (Deltasone) 10 mg tablet    cyclobenzaprine (Flexeril) 10 mg tablet      6. Bulging lumbar disc  Referral to Physical Medicine Rehab    ketorolac (Toradol) injection 30 mg    methylPREDNISolone acetate (DEPO-Medrol) injection 40 mg    methylPREDNISolone sodium succinate (PF) (SOLU-Medrol) injection 125 mg    predniSONE (Deltasone) 10 mg tablet    cyclobenzaprine (Flexeril) 10 mg tablet      7. Protrusion of lumbar intervertebral disc  Referral to Physical Medicine Rehab    ketorolac (Toradol) injection 30 mg    methylPREDNISolone acetate (DEPO-Medrol) injection 40 mg    methylPREDNISolone sodium succinate (PF) (SOLU-Medrol) injection 125 mg    predniSONE (Deltasone) 10 mg tablet    cyclobenzaprine (Flexeril) 10 mg tablet      8. Lumbar facet arthropathy  Referral to Physical Medicine Rehab    ketorolac (Toradol) injection 30 mg    methylPREDNISolone acetate (DEPO-Medrol) injection 40 mg    methylPREDNISolone sodium succinate (PF) (SOLU-Medrol) injection 125 mg    predniSONE (Deltasone) 10 mg tablet    cyclobenzaprine (Flexeril) 10 mg tablet      9. Left lumbar radiculopathy        10. Spinal stenosis of lumbar region, unspecified whether neurogenic claudication present        11. Lumbosacral radiculopathy due to degenerative joint disease of spine                Treatment or Intervention:  May  continue ice and moist heat therapy as needed  Start back into Physical Therapy 1-2 times a week for 8-10 weeks with manual therapy as well as dry needling and IASTM and lumbar traction: renate ZAIDI  Once again reviewed home exercises to be performed by the patient routinely  Stressed the importance of wearing shoes with good stability control to help with the biomechanics affecting the lower legs  Continue wearing full foot insoles to help with the biomechanics affecting the lower legs  Continue vitamin-D 2 -3000+ milligrams a day, as well as a daily multivitamin.   Continue over-the-counter curcumin, turmeric, boswellia, as well as egg shell membrane as directed to aid with joint inflammation.  Continue over-the-counter Move Free for joint health.  May continue to take OTC Tylenol Extra Strength or OTC Tylenol Arthritis, taking one every 6-8 hours with food as needed for pain management    10 DAY TAPER:  The following prescription was electronically sent to the patient's pharmacy of record: Prednisone 10mg, take 3 tablets a day x 5 days, 2 tablets a day x 5 days, 1 tablets a day x 5 days with food; start tomorrow if received Solu-Medrol injection in the office at visit, otherwise start immediately, dispense quantity sufficient, with no refills   TREATMENT PLAN: Patient received IM injection of SoluMedrol 125 milligrams at the time of this office visit. , Patient received IM injection of Toradol 60 milligrams at the time of this office visit., and Patient received IM injection of DepoMedrol 40 milligrams at the time of this office visit.  The following prescription was electronically sent to the patient's pharmacy of record: Flexeril (Cyclobenzaprine) 10 milligrams, may take 1 tablet 1-2 hours before bedtime as needed for muscle relaxation, Duration: 30 days, Dispense: #30, Refill: 0  Patient advised regarding the risks and/or potential adverse reactions and/or side effects of any prescribed medications along with  any over-the-counter medications or any supplements used. Patient advised to seek immediate medical care if any adverse reactions occur. The patient and/or patient(s) parent(s) verbalized their understanding.  Discussed in detail with the patient to the level of their understanding the possibility in the future of LEFT ankle MRI if needed if reaggravated to rule out ligament or tendon injury versus stress fracture versus other  Continue 9.0 millimeter heel lift to be placed in the 9.0 shoe to accommodate for leg length discrepancy found on standing erect pelvis xray to be placed into the right shoe  Possibility of future recommendation custom orthotics with built in 10.13 mm heel lift in the right shoe  referral to physical medicine and rehab or pain management Dr. Bunn for epidural injections  Possibility of new MRI in the future if needed  Follow-up in approximately 3 months or sooner if needed      KO ROACH on 8/6/25 at 9:26 AM.     Please note that this report has been produced using speech recognition software.  It may contain errors related to grammar, punctuation or spelling.  Electronically signed, but not reviewed.      Ko Roach D.O. FAOASM         [1] No family history on file.

## 2025-08-05 NOTE — TELEPHONE ENCOUNTER
Patient called and left message stating that his low back pain is worse since he saw you last Monday. He would like a referral to the doctor you were talking to him about in regards to his back and pain.    Patient is also wanting to come in to office for a shot to help manage pain.    Please reach out to patient. Thanks

## 2025-08-06 ENCOUNTER — OFFICE VISIT (OUTPATIENT)
Dept: ORTHOPEDIC SURGERY | Facility: CLINIC | Age: 70
End: 2025-08-06
Payer: MEDICARE

## 2025-08-06 VITALS
HEIGHT: 68 IN | WEIGHT: 172 LBS | HEART RATE: 68 BPM | SYSTOLIC BLOOD PRESSURE: 129 MMHG | DIASTOLIC BLOOD PRESSURE: 74 MMHG | BODY MASS INDEX: 26.07 KG/M2

## 2025-08-06 DIAGNOSIS — M21.70 LEG LENGTH DISCREPANCY: Primary | ICD-10-CM

## 2025-08-06 DIAGNOSIS — M54.16 LEFT LUMBAR RADICULOPATHY: ICD-10-CM

## 2025-08-06 DIAGNOSIS — M51.360 DEGENERATION OF INTERVERTEBRAL DISC OF LUMBAR REGION WITH DISCOGENIC BACK PAIN: ICD-10-CM

## 2025-08-06 DIAGNOSIS — M99.03 LUMBAR REGION SOMATIC DYSFUNCTION: ICD-10-CM

## 2025-08-06 DIAGNOSIS — M51.369 BULGING LUMBAR DISC: ICD-10-CM

## 2025-08-06 DIAGNOSIS — M47.27 LUMBOSACRAL RADICULOPATHY DUE TO DEGENERATIVE JOINT DISEASE OF SPINE: ICD-10-CM

## 2025-08-06 DIAGNOSIS — M48.061 SPINAL STENOSIS OF LUMBAR REGION, UNSPECIFIED WHETHER NEUROGENIC CLAUDICATION PRESENT: ICD-10-CM

## 2025-08-06 DIAGNOSIS — M48.061 DEGENERATIVE LUMBAR SPINAL STENOSIS: ICD-10-CM

## 2025-08-06 DIAGNOSIS — M51.26 PROTRUSION OF LUMBAR INTERVERTEBRAL DISC: ICD-10-CM

## 2025-08-06 DIAGNOSIS — M47.816 LUMBAR FACET ARTHROPATHY: ICD-10-CM

## 2025-08-06 DIAGNOSIS — S39.012D LUMBOSACRAL STRAIN, SUBSEQUENT ENCOUNTER: ICD-10-CM

## 2025-08-06 PROCEDURE — 96372 THER/PROPH/DIAG INJ SC/IM: CPT | Performed by: FAMILY MEDICINE

## 2025-08-06 PROCEDURE — 2500000004 HC RX 250 GENERAL PHARMACY W/ HCPCS (ALT 636 FOR OP/ED): Mod: JZ | Performed by: FAMILY MEDICINE

## 2025-08-06 PROCEDURE — 99213 OFFICE O/P EST LOW 20 MIN: CPT | Performed by: FAMILY MEDICINE

## 2025-08-06 RX ORDER — CYCLOBENZAPRINE HCL 10 MG
10 TABLET ORAL DAILY
Qty: 30 TABLET | Refills: 1 | Status: SHIPPED | OUTPATIENT
Start: 2025-08-06

## 2025-08-06 RX ORDER — PREDNISONE 20 MG/1
TABLET ORAL
Qty: 30 TABLET | Refills: 0 | Status: CANCELLED | OUTPATIENT
Start: 2025-08-06 | End: 2025-08-16

## 2025-08-06 RX ORDER — METHYLPREDNISOLONE ACETATE 40 MG/ML
40 INJECTION, SUSPENSION INTRA-ARTICULAR; INTRALESIONAL; INTRAMUSCULAR; SOFT TISSUE ONCE
Status: COMPLETED | OUTPATIENT
Start: 2025-08-06 | End: 2025-08-06

## 2025-08-06 RX ORDER — METHYLPREDNISOLONE SODIUM SUCCINATE 125 MG/2ML
125 INJECTION INTRAMUSCULAR; INTRAVENOUS ONCE
Status: COMPLETED | OUTPATIENT
Start: 2025-08-06 | End: 2025-08-06

## 2025-08-06 RX ORDER — KETOROLAC TROMETHAMINE 30 MG/ML
30 INJECTION, SOLUTION INTRAMUSCULAR; INTRAVENOUS ONCE
Status: COMPLETED | OUTPATIENT
Start: 2025-08-06 | End: 2025-08-06

## 2025-08-06 RX ORDER — PREDNISONE 20 MG/1
TABLET ORAL
Qty: 30 TABLET | Refills: 0 | OUTPATIENT
Start: 2025-08-06 | End: 2025-08-16

## 2025-08-06 RX ORDER — PREDNISONE 10 MG/1
TABLET ORAL
Qty: 30 TABLET | Refills: 0 | Status: SHIPPED | OUTPATIENT
Start: 2025-08-06

## 2025-08-06 RX ADMIN — KETOROLAC TROMETHAMINE 30 MG: 60 INJECTION, SOLUTION INTRAMUSCULAR at 09:30

## 2025-08-06 RX ADMIN — METHYLPREDNISOLONE ACETATE 40 MG: 40 INJECTION, SUSPENSION INTRA-ARTICULAR; INTRALESIONAL; INTRAMUSCULAR; SOFT TISSUE at 09:31

## 2025-08-06 RX ADMIN — METHYLPREDNISOLONE SODIUM SUCCINATE 125 MG: 125 INJECTION, POWDER, FOR SOLUTION INTRAMUSCULAR; INTRAVENOUS at 09:32

## 2025-08-06 ASSESSMENT — PAIN DESCRIPTION - DESCRIPTORS: DESCRIPTORS: ACHING;SORE;THROBBING

## 2025-08-06 ASSESSMENT — PAIN SCALES - GENERAL
PAINLEVEL_OUTOF10: 9
PAINLEVEL_OUTOF10: 9

## 2025-08-06 ASSESSMENT — PAIN - FUNCTIONAL ASSESSMENT: PAIN_FUNCTIONAL_ASSESSMENT: 0-10

## 2025-08-20 ENCOUNTER — DOCUMENTATION (OUTPATIENT)
Dept: PHYSICAL THERAPY | Facility: CLINIC | Age: 70
End: 2025-08-20
Payer: MEDICARE

## 2025-08-25 ENCOUNTER — TELEPHONE (OUTPATIENT)
Dept: ORTHOPEDIC SURGERY | Facility: CLINIC | Age: 70
End: 2025-08-25
Payer: MEDICARE

## 2025-09-04 ENCOUNTER — APPOINTMENT (OUTPATIENT)
Dept: NEUROSURGERY | Facility: HOSPITAL | Age: 70
End: 2025-09-04
Payer: MEDICARE